# Patient Record
Sex: MALE | Race: BLACK OR AFRICAN AMERICAN | NOT HISPANIC OR LATINO | Employment: UNEMPLOYED | URBAN - METROPOLITAN AREA
[De-identification: names, ages, dates, MRNs, and addresses within clinical notes are randomized per-mention and may not be internally consistent; named-entity substitution may affect disease eponyms.]

---

## 2017-01-18 ENCOUNTER — OFFICE VISIT (OUTPATIENT)
Dept: PULMONOLOGY | Facility: CLINIC | Age: 47
End: 2017-01-18
Payer: OTHER MISCELLANEOUS

## 2017-01-18 ENCOUNTER — PROCEDURE VISIT (OUTPATIENT)
Dept: PULMONOLOGY | Facility: CLINIC | Age: 47
End: 2017-01-18
Payer: OTHER MISCELLANEOUS

## 2017-01-18 ENCOUNTER — HOSPITAL ENCOUNTER (OUTPATIENT)
Dept: RADIOLOGY | Facility: HOSPITAL | Age: 47
Discharge: HOME | End: 2017-01-18
Attending: INTERNAL MEDICINE
Payer: OTHER MISCELLANEOUS

## 2017-01-18 VITALS
SYSTOLIC BLOOD PRESSURE: 128 MMHG | WEIGHT: 315 LBS | HEIGHT: 67 IN | OXYGEN SATURATION: 97 % | BODY MASS INDEX: 49.44 KG/M2 | DIASTOLIC BLOOD PRESSURE: 86 MMHG | HEART RATE: 80 BPM

## 2017-01-18 DIAGNOSIS — F32.9 REACTIVE DEPRESSION (SITUATIONAL): ICD-10-CM

## 2017-01-18 DIAGNOSIS — F51.05 INSOMNIA DUE TO ANXIETY AND FEAR: ICD-10-CM

## 2017-01-18 DIAGNOSIS — R06.02 SOB (SHORTNESS OF BREATH): ICD-10-CM

## 2017-01-18 DIAGNOSIS — F40.9 INSOMNIA DUE TO ANXIETY AND FEAR: ICD-10-CM

## 2017-01-18 DIAGNOSIS — T59.91XA INHALATION OF NOXIOUS FUMES, ACCIDENTAL OR UNINTENTIONAL, INITIAL ENCOUNTER: Primary | ICD-10-CM

## 2017-01-18 PROCEDURE — 99999 PR PBB SHADOW E&M-EST. PATIENT-LVL III: CPT | Mod: PBBFAC,,, | Performed by: INTERNAL MEDICINE

## 2017-01-18 PROCEDURE — 99205 OFFICE O/P NEW HI 60 MIN: CPT | Mod: 25,S$GLB,, | Performed by: INTERNAL MEDICINE

## 2017-01-18 PROCEDURE — 71020 XR CHEST PA AND LATERAL: CPT | Mod: 26,,, | Performed by: RADIOLOGY

## 2017-01-18 RX ORDER — PROMETHAZINE HYDROCHLORIDE AND DEXTROMETHORPHAN HYDROBROMIDE 6.25; 15 MG/5ML; MG/5ML
5 SYRUP ORAL EVERY 6 HOURS PRN
Qty: 473 ML | Refills: 5 | Status: SHIPPED | OUTPATIENT
Start: 2017-01-18 | End: 2017-03-01 | Stop reason: SDUPTHER

## 2017-01-18 RX ORDER — BUDESONIDE 0.5 MG/2ML
0.5 INHALANT ORAL 2 TIMES DAILY
Qty: 120 ML | Refills: 12 | Status: SHIPPED | OUTPATIENT
Start: 2017-01-18 | End: 2017-03-01 | Stop reason: SDUPTHER

## 2017-01-18 RX ORDER — BENZONATATE 100 MG/1
100 CAPSULE ORAL 3 TIMES DAILY PRN
COMMUNITY
End: 2017-03-01 | Stop reason: SDUPTHER

## 2017-01-18 RX ORDER — TRAZODONE HYDROCHLORIDE 100 MG/1
100 TABLET ORAL NIGHTLY
Qty: 30 TABLET | Refills: 11 | Status: SHIPPED | OUTPATIENT
Start: 2017-01-18 | End: 2017-02-09 | Stop reason: SDUPTHER

## 2017-01-18 RX ORDER — ACETAMINOPHEN 500 MG
500 TABLET ORAL EVERY 6 HOURS PRN
COMMUNITY
End: 2018-03-13

## 2017-01-18 RX ORDER — MONTELUKAST SODIUM 10 MG/1
10 TABLET ORAL NIGHTLY
COMMUNITY
End: 2017-03-01 | Stop reason: SDUPTHER

## 2017-01-18 RX ORDER — IPRATROPIUM BROMIDE AND ALBUTEROL SULFATE 2.5; .5 MG/3ML; MG/3ML
3 SOLUTION RESPIRATORY (INHALATION) EVERY 6 HOURS
Qty: 120 VIAL | Refills: 12 | Status: SHIPPED | OUTPATIENT
Start: 2017-01-18 | End: 2017-03-01 | Stop reason: SDUPTHER

## 2017-01-18 RX ORDER — IPRATROPIUM BROMIDE 0.5 MG/2.5ML
500 SOLUTION RESPIRATORY (INHALATION) 4 TIMES DAILY
COMMUNITY
End: 2017-01-18 | Stop reason: ALTCHOICE

## 2017-01-18 NOTE — PROGRESS NOTES
January 18, 2017    INITIAL PULMONARY OFFICE NOTE SECOND OPINION    CLAIM NUMBER:  [id number].    [facility] case number [id number]    EMPLOYER:  [employer].    DATE OF INCIDENT:  04/14/2016.    Dear Ms. Finley:    I had the pleasure of meeting and evaluating Mr. Lamin Manjarrez today at the   [facility].  The purpose of this visit is a second   opinion concerning toxic fume inhalation.  Information available for my reviews   includes a four-page fax from [facility], which includes a patient questionnaire and   pulmonary function studies performed in 2016 as well as a radiographic disc with   x-rays.  Unfortunately, the disk was damaged in mailing and could not be   visualized.  In the note of 12/13/2016, you indicated that you forwarded me the   medicals from Dr. Rowan' office previously.  This information is not   immediately available for my review at the time of this dictation.    Mr. 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    Apparently, a co-worker allowed the release of gas in the direction where he was   walking.  He was immediately affected with irritation from these fumes.  The   patient states that fumes contained hydrochloric acid that he was in close   proximity.  He reports immediate irritation to his throat and difficulty   breathing.  He was able to run from the area.  He cleared the area after a   fairly short period of exposure.  He became nauseated and had some gagging.  He   reports that he had some irritation to the mucous membranes of his mouth.  No   problems with his eyes, his ears or other soft tissues on his face.  Following   this period of exertion from running, he passed out and was brought to the   hospital.  He reports that he awoke at [facility] in [place], [place]   and was treated and the left without requiring prolonged  hospitalization.  He   was seen the following day at the [facility] for difficulty breathing.    He was treated with inhaled bronchodilators, steroids and later was evaluated   by Dr. Rowan and has taken bronchodilators, albuterol and Symbicort.    Since this accident, the patient reports that he has persistent coughing and   shortness of breath.  He is unable perform work.  He does not tolerate dust,   fumes, perfumes or changes in temperature, especially with cold air.  The   coughing is sometimes severe enough that he has difficulty sleeping at night.    As a result of this illness, the patient reports that he has become very anxious   and jittery, he has difficulty sleeping at night and difficulty coping.  He   reports that his wife has left him and he remains extremely anxious.  He reports   no prior history of mental illness.  The patient is frustrated over the fact   that his symptoms have persisted and his present treatment regimen has not   completely resolved his symptoms.    The patient completed a personal history questionnaire concerning his past   medical history, family history and social history.  It is summarized as   follows.    The patient denies any significant past history of pulmonary or respiratory   problems prior to this incident.  He denies any significant past medical   problems.  He has had no prior history of high blood pressure, diabetes, heart   disease, musculoskeletal problems.  He reports he has had no surgeries.    SOCIAL HISTORY:  His spouse's health is good.  He has a number of children.  He   reports that they are in good health.    FAMILY HISTORY:  His father is alive at 71.  His mother is 68.  He has a brother   at 55, he does not list any medical problems with family members.    SOCIAL HISTORY:  Denies a significant history of tobacco use or alcohol use.    REVIEW OF SYSTEMS:  The patient reports occasional night sweats, fatigue, lack   of energy, headaches,  dizziness.  He reports occasional difficulty with ringing   in his ears.  He has postnasal drip and sinus congestion.  CARDIAC:  Occasional palpitations and chest discomfort.  The patient reports   musculoskeletal discomfort in his right shoulder.  He reports discomfort in his   legs, difficulty breathing as noticed in the history of present illness.  He has   difficulty at nighttime and sometimes he has to sleep propped up in bed.  GASTROINTESTINAL:  He has had changes in his appetite, heartburn, dyspepsia.  He   reports about a 15-pound weight gain.  Describes a frequency of urination,   problems with sexual function.  MUSCULOSKELETAL:  Problems with heaviness in his arms and legs, swelling in his   lower extremities, generalized muscle weakness.  NEURO/PSYCH:  The patient states he has become very high-strung intense.  He has   had significant difficulty with home relationships, difficulty making up his   mind, he is depressed and anxious about his future.    OCCUPATIONAL HISTORY:  Dust screening questionnaire, the patient denies any   significant history of exposure to asbestos or silica dust.  He has a history of   being exposed to welding fumes.  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  He was exposed to high   concentrations of welding fumes.    He reports no shipyard work, no carpentry work.    At times, he worked during shut downs, other times repairs and petrochemical   facilities.  He has no direct knowledge of exposure to asbestos.    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  He   usually was provided with a fresh air cortez for respiratory protection.  He was   exposed to large amount of sand from sandblasting.  He himself actually   performed  sandblasting.  He also was involved in helping regulate pressure on   sandblasting equipment.  He would fix hoses and fill the sand posts with   sandblasters.  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    PHYSICAL EXAMINATION:  VITAL SIGNS:  Height is 5 feet 7 inches.  His weight is 161 kg, blood pressure   128/86, pulse is 80 and regular, respiratory rate 14.   Oxygen saturation is 97%.  HEENT:  Pupils equal and reactive to light, slightly boggy nasal mucosa.  Throat   is normal.  NECK:  No lymphadenopathy.  CHEST:  Clear to auscultation.  No wheezing, rales or rhonchi.  HEART:  Distant heart sounds.  Regular rhythm.  ABDOMEN:  Soft.  Liver and spleen not enlarged.  EXTREMITIES:  No cyanosis or clubbing.  There is trace edema present.  NEUROLOGIC EXAMINATION:  Cranial nerves II-XII grossly within normal limits.    Motor and sensory grossly normal.    The patient appeared to be somewhat agitated and anxious during the course of   the interview, very frustrated over the lack of response to his present   treatment.  Frustrated over his difficulty sleeping at night due to his   respiratory condition.    DATA REVIEW:  Chest x-ray from the [facility], poor inspiratory   effort, heart size appears to be normal.    The patient is unable to perform pulmonary function studies in the office today.    Prior pulmonary function studies from [facility] occupational health   services in [place], [place] ordered by Dr. Rowan, the patient had some   difficulty performing spirometry maneuver due to coughing.  His best FEV1 was   2.62 liters, 80% of predicted performed on 06/17/2016.  Additional pulmonary   function study performed on 05/17/2016 also demonstrated difficulty in   performing the maneuver, on that date it was 1.37 liters, 44% of predicted.    ASSESSMENT:  Toxic fume inhalation.    RECOMMENDATIONS: The present treatment has not completely alleviated his symptoms.  Will  start on a more aggressive regimen of inhaled bronchodilators.   We will start the patient on a combination of albuterol and   ipratropium jet neb treatments followed by budesonide.  The patient's present   prescriptions include metered-dose inhalers which are appropriate; however, I do   not believe the patient is able to get a deep inhalation of these medications   due to his coughing and advised him to take these inhaled medications on a   regular basis and follow up in my office in 6 weeks with a repeat spirometry.  Follow up would be helpful in determining response to therapy.    Overall, this patient has a history of toxic fume inhalation complicated by   hyperactive airways. He has an overlay of anxiety which is making his response   to therapy difficult to objectively evaluate.    He is unable to work at this time at his prior profession.  He is unable to be   exposed to dust, fumes, gasses or extremes in temperature.  Based of the   information I have available here, he would not pass a respiratory fit exam.    Consideration should be made for him to move on to some other field.  We can   continue to work without being exposed to dust, fumes or gases.    Sincerely,      Matias Lugo MD  Pulmonary / Critical Care Medicine        GMBREANA/MELISSA  dd: 01/18/2017 13:46:35 (CST)  td: 01/18/2017 23:22:37 (CST)  Doc ID   #[id number]  Job ID #[id number]    CC:

## 2017-01-18 NOTE — MR AVS SNAPSHOT
[facility] - Pul[facility] Services  [address] [facility] [address] [place] [address]  Phone: [phone]  Fax: [phone]                  Lamin Manjarrez   2017 1:00 PM   Office Visit    Description:  Male : 1970   Provider:  Matias Lugo MD   Department:  [facility] - [facility]           Diagnoses this Visit        Comments    Inhalation of noxious fumes, accidental or unintentional, initial encounter    -  Primary     Reactive depression (situational)         Insomnia due to anxiety and fear                To Do List           Goals (5 Years of Data)     None      Follow-Up and Disposition     Return in about 6 weeks (around 3/1/2017) for pft.       These Medications        Disp Refills Start End    albuterol-ipratropium 2.5mg-0.5mg/3mL (DUO-NEB) 0.5 mg-3 mg(2.5 mg base)/3 mL nebulizer solution 120 vial 12 2017    Take 3 mLs by nebulization every 6 (six) hours. Worker's comp - Nebulization    Pharmacy: [id number] - [place] [address]  Ph #: [phone]       budesonide (PULMICORT) 0.5 mg/2 mL nebulizer solution 120 mL 12 2017    Take 2 mLs (0.5 mg total) by nebulization 2 (two) times daily. Wash out mouth after using.Worker's comp - Nebulization    Pharmacy: [id number] - [place] [address]  Ph #: [phone]       Notes to Pharmacy: Dispense # 60 vials of 0.5mg/2ml    promethazine-dextromethorphan (PROMETHAZINE-DM) 6.25-15 mg/5 mL Syrp 473 mL 5 2017     Take 5 mLs by mouth every 6 (six) hours as needed. Worker's comp - Oral    Pharmacy: [id number] - [place] [address]  Ph #: [phone]       trazodone (DESYREL) 100 MG tablet 30 tablet 11 2017    Take 1 tablet (100 mg total) by mouth every evening. Worker's comp - Oral    Pharmacy: [id number] - [place] - [address]  Ph #: [phone]         [facility]     [facility] -  Assistance  Registered nurses  in the [facility] provide clinical advisement, health education, appointment booking, and other advisory services.  Call for this free service at [phone].             Medications           Message regarding Medications     Verify the changes and/or additions to your medication regime listed below are the same as discussed with your clinician today.  If any of these changes or additions are incorrect, please notify your healthcare provider.        START taking these NEW medications        Refills    albuterol-ipratropium 2.5mg-0.5mg/3mL (DUO-NEB) 0.5 mg-3 mg(2.5 mg base)/3 mL nebulizer solution 12    Sig: Take 3 mLs by nebulization every 6 (six) hours. Worker's comp    Class: Normal    Route: Nebulization    budesonide (PULMICORT) 0.5 mg/2 mL nebulizer solution 12    Sig: Take 2 mLs (0.5 mg total) by nebulization 2 (two) times daily. Wash out mouth after using.Worker's comp    Class: Normal    Route: Nebulization    promethazine-dextromethorphan (PROMETHAZINE-DM) 6.25-15 mg/5 mL Syrp 5    Sig: Take 5 mLs by mouth every 6 (six) hours as needed. Worker's comp    Class: Normal    Route: Oral    trazodone (DESYREL) 100 MG tablet 11    Sig: Take 1 tablet (100 mg total) by mouth every evening. Worker's comp    Class: Normal    Route: Oral      STOP taking these medications     ipratropium (ATROVENT) 0.02 % nebulizer solution Take 500 mcg by nebulization 4 (four) times daily.           Verify that the below list of medications is an accurate representation of the medications you are currently taking.  If none reported, the list may be blank. If incorrect, please contact your healthcare provider. Carry this list with you in case of emergency.           Current Medications     acetaminophen (TYLENOL) 500 MG tablet Take 500 mg by mouth every 6 (six) hours as needed for Pain.    ALBUTEROL SULFATE (VENTOLIN HFA INHL) Inhale into the lungs.    albuterol-ipratropium 2.5mg-0.5mg/3mL (DUO-NEB) 0.5 mg-3 mg(2.5 mg  "base)/3 mL nebulizer solution Take 3 mLs by nebulization every 6 (six) hours. Worker's comp    benzonatate (TESSALON) 100 MG capsule Take 100 mg by mouth 3 (three) times daily as needed for Cough.    budesonide (PULMICORT) 0.5 mg/2 mL nebulizer solution Take 2 mLs (0.5 mg total) by nebulization 2 (two) times daily. Wash out mouth after using.Worker's comp    montelukast (SINGULAIR) 10 mg tablet Take 10 mg by mouth every evening.    promethazine-dextromethorphan (PROMETHAZINE-DM) 6.25-15 mg/5 mL Syrp Take 5 mLs by mouth every 6 (six) hours as needed. Worker's comp    trazodone (DESYREL) 100 MG tablet Take 1 tablet (100 mg total) by mouth every evening. Worker's comp           Clinical Reference Information           Vital Signs - Last Recorded  Most recent update: 1/18/2017 11:42 AM by Laila Tovar LPN    BP Pulse Ht Wt SpO2 BMI    128/86 80 5' 7" (1.702 m) (!) 161 kg (354 lb 15.1 oz) 97% 55.59 kg/m2      Blood Pressure          Most Recent Value    BP  128/86      Allergies as of 1/18/2017     Toradol [Ketorolac]      Immunizations Administered on Date of Encounter - 1/18/2017     None      Orders Placed During Today's Visit      Normal Orders This Visit    NEBULIZER FOR HOME USE     NEBULIZER KIT (SUPPLIES) FOR HOME USE     Future Labs/Procedures Expected by Expires    Complete PFT with bronchodilator  As directed 1/18/2018      [facility] Sign-Up     Activating your [facility] account is as easy as 1-2-3!     1) Visit [email or web address], select Sign Up Now, enter this activation code and your date of birth, then select Next.  [email or web address]  Expires: 3/4/2017  1:00 PM      2) Create a username and password to use when you visit [facility] in the future and select a security question in case you lose your password and select Next.    3) Enter your e-mail address and click Sign Up!    Additional Information  If you have questions, please e-mail [email or web address] or call [phone] to talk to our [facility] " staff. Remember, [facility] is NOT to be used for urgent needs. For medical emergencies, dial 911.         Instructions    Budesonide Nebulizer suspension  What is this medicine?  BUDESONIDE (bue PAPA oh nide) is a corticosteroid. It helps decrease inflammation in your lungs. This medicine is used to treat the symptoms of asthma. Never use this medicine for an acute asthma attack.  This medicine may be used for other purposes; ask your health care provider or pharmacist if you have questions.  What should I tell my health care provider before I take this medicine?  They need to know if you have any of these conditions:  · bone problems  · glaucoma  · immune system problems  · infection, like chickenpox, tuberculosis, herpes, or fungal infection  · recent surgery or injury of the mouth or throat  · taking corticosteroids by mouth  · an unusual or allergic reaction to budesonide, steroids, other medicines, foods, dyes, or preservatives  · pregnant or trying to get pregnant  · breast-feeding  How should I use this medicine?  This medicine is used in a nebulizer. Nebulizers make a liquid into an aerosol that you breathe in through your mouth or your mouth and nose into your lungs. You will be taught how to use your nebulizer. Rinse your mouth with water after use. Follow the directions on your prescription label. Do not mix this medicine with other medicines in your nebulizer. Do not use more often than directed.  Talk to your pediatrician regarding the use of this medicine in children. Special care may be needed.  Overdosage: If you think you have taken too much of this medicine contact a poison control center or emergency room at once.  NOTE: This medicine is only for you. Do not share this medicine with others.  What if I miss a dose?  If you miss a dose, use it as soon as you remember. If it is almost time for your next dose, use only that dose and continue with your regular schedule, spacing doses evenly. Do not use  double or extra doses.  What may interact with this medicine?  Do not take this medicine with any of the following medications:  · mifepristone  This medicine may also interact with the following medications:  · cimetidine  · clarithromycin  · erythromycin  · itraconazole  · ketoconazole  · some vaccinations  This list may not describe all possible interactions. Give your health care provider a list of all the medicines, herbs, non-prescription drugs, or dietary supplements you use. Also tell them if you smoke, drink alcohol, or use illegal drugs. Some items may interact with your medicine.  What should I watch for while using this medicine?  Visit your doctor or health care professional for regular checks on your progress. Check with your health care professional if your symptoms do not improve. If your symptoms get worse or if you need your short acting inhalers more often, call your doctor right away.  The medicine may increase your risk of getting an infection. Stay away from people who are sick. Tell your doctor or health care professional if you are around anyone with measles or chickenpox.  What side effects may I notice from receiving this medicine?  Side effects that you should report to your doctor or health care professional as soon as possible:  · allergic reactions like skin rash, itching or hives, swelling of the face, lips, or tongue  · breathing problems  · changes in vision  · unusual swelling  · white patches or sores in the mouth or throat  Side effects that usually do not require medical attention (report to your doctor or health care professional if they continue or are bothersome):  · coughing, hoarseness  · headache  · runny nose  · stomach upset  This list may not describe all possible side effects. Call your doctor for medical advice about side effects. You may report side effects to FDA at 2-415-FDA-3856.  Where should I keep my medicine?  Keep out of the reach of children.  Store at a room  temperature between 20 and 25 degrees C (68 and 77 degrees F). Do not refrigerate or freeze. Keep unopened vials in the foil pouch. When the package has been opened, the shelf life of the unused medicine is 2 weeks when protected from light. Unused medicine should be returned to the aluminum foil envelope right away to protect them from light. Throw away any unused medicine after the expiration date.  NOTE:This sheet is a summary. It may not cover all possible information. If you have questions about this medicine, talk to your doctor, pharmacist, or health care provider. Copyright© 2016 [employer]      Ipratropium Bromide, Albuterol Sulfate Nebulizer solution  What is this medicine?  ALBUTEROL; IPRATROPIUM (al BYOO ter ole; i pra TROE pee um) has two bronchodilators. It helps open up the airways in your lungs to make it easier to breathe. This medicine is used to treat chronic obstructive pulmonary disease (COPD).  This medicine may be used for other purposes; ask your health care provider or pharmacist if you have questions.  What should I tell my health care provider before I take this medicine?  They need to know if you have any of the following conditions:  · heart disease  · high blood pressure  · irregular heartbeat  · an unusual or allergic reaction to albuterol, ipratropium, atropine, soya protein, soybeans or peanuts, other medicines, foods, dyes, or preservatives  · pregnant or trying to get pregnant  · breast-feeding  How should I use this medicine?  This medicine is used in a nebulizer. Nebulizers make a liquid into an aerosol that you breathe in through your mouth or your mouth and nose into your lungs. You will be taught how to use your nebulizer. Follow the directions on your prescription label. Take your medicine at regular intervals. Do not use more often than directed.  Talk to your pediatrician regarding the use of this medicine in children. Special care may be needed.  Overdosage: If you  think you have taken too much of this medicine contact a poison control center or emergency room at once.  NOTE: This medicine is only for you. Do not share this medicine with others.  What if I miss a dose?  If you miss a dose, use it as soon as you can. If it is almost time for your next dose, use only that dose. Do not use double or extra doses.  What may interact with this medicine?  Do not take this medicine with any of the following medications:  · MAOIs like Carbex, Eldepryl, Marplan, Nardil, and Parnate  This medicine may also interact with the following medications:  · diuretics  · medicines for depression, anxiety, or psychotic disturbances  · medicines for irregular heartbeat  · medicines for weight loss including some herbal products  · methadone  · pimozide  · some medicines for blood pressure or the heart  · sertindole  This list may not describe all possible interactions. Give your health care provider a list of all the medicines, herbs, non-prescription drugs, or dietary supplements you use. Also tell them if you smoke, drink alcohol, or use illegal drugs. Some items may interact with your medicine.  What should I watch for while using this medicine?  Tell your doctor or healthcare professional if your symptoms do not start to get better or if they get worse. If your breathing gets worse while you are using this medicine, call your doctor right away. Do not stop using your medicine unless your doctor tells you to.  Your mouth may get dry. Chewing sugarless gum or sucking hard candy, and drinking plenty of water may help. Contact your doctor if the problem does not go away or is severe.  You may get dizzy or have blurred vision. Do not drive, use machinery, or do anything that needs mental alertness until you know how this medicine affects you. Do not stand or sit up quickly, especially if you are an older patient. This reduces the risk of dizzy or fainting spells.  What side effects may I notice from  receiving this medicine?  Side effects that you should report to your doctor or health care professional as soon as possible:  · allergic reactions like skin rash, itching or hives, swelling of the face, lips, or tongue  · breathing problems  · feeling faint or lightheaded, falls  · fever  · high blood pressure  · irregular heartbeat or chest pain  · muscle cramps or weakness  · pain, tingling, numbness in the hands or feet  · vomiting  Side effects that usually do not require medical attention (report to your doctor or health care professional if they continue or are bothersome):  · blurred vision  · cough  · difficulty passing urine  · difficulty sleeping  · headache  · nervousness or trembling  · stuffy or runny nose  · unusual taste  · upset stomach  This list may not describe all possible side effects. Call your doctor for medical advice about side effects. You may report side effects to FDA at 4-314-FDA-7514.  Where should I keep my medicine?  Keep out of the reach of children.  Store at a room temperature 2 and 30 degees C (36 to 86 degrees F). Protect from light. Store this medicine in the protective pouch until ready to use. Throw away any unused medicine after the expiration date.  NOTE:This sheet is a summary. It may not cover all possible information. If you have questions about this medicine, talk to your doctor, pharmacist, or health care provider. Copyright© 2016 [employer]        Step-by-Step  Using a Nebulizer with a Mouthpiece    © 1903-3192 [employer]. [address] [place] [address]. All rights reserved. This information is not intended as a substitute for professional medical care. Always follow your healthcare professional's instructions.        Step-by-Step  Using a Nebulizer    © 0828-5536 [employer]. [address] [place] [address]. All rights reserved. This information is not intended as a substitute for professional medical care. Always  follow your healthcare professional's instructions.      Trazodone Hydrochloride Oral tablet  What is this medicine?  TRAZODONE (TRAZ oh done) is used to treat depression.  This medicine may be used for other purposes; ask your health care provider or pharmacist if you have questions.  What should I tell my health care provider before I take this medicine?  They need to know if you have any of these conditions:  · attempted suicide or thinking about it  · bipolar disorder  · bleeding problems  · glaucoma  · heart disease, or previous heart attack  · irregular heart beat  · kidney or liver disease  · low levels of sodium in the blood  · an unusual or allergic reaction to trazodone, other medicines, foods, dyes or preservatives  · pregnant or trying to get pregnant  · breast-feeding  How should I use this medicine?  Take this medicine by mouth with a glass of water. Follow the directions on the prescription label. Take this medicine shortly after a meal or a light snack. Take your medicine at regular intervals. Do not take your medicine more often than directed. Do not stop taking this medicine suddenly except upon the advice of your doctor. Stopping this medicine too quickly may cause serious side effects or your condition may worsen.  A special MedGuide will be given to you by the pharmacist with each prescription and refill. Be sure to read this information carefully each time.  Talk to your pediatrician regarding the use of this medicine in children. Special care may be needed.  Overdosage: If you think you have taken too much of this medicine contact a poison control center or emergency room at once.  NOTE: This medicine is only for you. Do not share this medicine with others.  What if I miss a dose?  If you miss a dose, take it as soon as you can. If it is almost time for your next dose, take only that dose. Do not take double or extra doses.  What may interact with this medicine?  Do not take this medicine with  any of the following medications:  · cisapride  · dofetilide  · dronedarone  · fluconazole  · linezolid  · MAOIs like Carbex, Eldepryl, Marplan, Nardil, and Parnate  · mesoridazine  · methylene blue (injected into a vein)  · pimozide  · posaconazole  · saquinavir  · thioridazine  · ziprasidone  This medicine may also interact with the following medications:  · alcohol  · antiviral medicines for HIV or AIDS  · aspirin and aspirin-like medicines  · barbiturates such as phenobarbital  · certain medicines that treat or prevent blood clots like warfarin, enoxaparin, and dalteparin  · certain medicines for blood pressure, heart disease, irregular heart beat  · certain medicines for depression, anxiety, or psychotic disturbances  · certain medicines for migraine headache like almotriptan, eletriptan, frovatriptan, naratriptan, rizatriptan, sumatriptan, zolmitriptan  · certain medicines for sleep  · digoxin  · fentanyl  · ketoconazole  · lithium  · medicines for seizures like carbamazepine and phenytoin  · NSAIDS, medicines for pain and inflammation, like ibuprofen or naproxen  · rasagiline  · supplements like Roel's wort, kava kava, valerian  · tramadol  · tryptophan  This list may not describe all possible interactions. Give your health care provider a list of all the medicines, herbs, non-prescription drugs, or dietary supplements you use. Also tell them if you smoke, drink alcohol, or use illegal drugs. Some items may interact with your medicine.  What should I watch for while using this medicine?  Tell your doctor if your symptoms do not get better or if they get worse. Visit your doctor or health care professional for regular checks on your progress. Because it may take several weeks to see the full effects of this medicine, it is important to continue your treatment as prescribed by your doctor.  Patients and their families should watch out for new or worsening thoughts of suicide or depression. Also watch out  for sudden changes in feelings such as feeling anxious, agitated, panicky, irritable, hostile, aggressive, impulsive, severely restless, overly excited and hyperactive, or not being able to sleep. If this happens, especially at the beginning of treatment or after a change in dose, call your health care professional.  You may get drowsy or dizzy. Do not drive, use machinery, or do anything that needs mental alertness until you know how this medicine affects you. Do not stand or sit up quickly, especially if you are an older patient. This reduces the risk of dizzy or fainting spells. Alcohol may interfere with the effect of this medicine. Avoid alcoholic drinks.  This medicine may cause dry eyes and blurred vision. If you wear contact lenses you may feel some discomfort. Lubricating drops may help. See your eye doctor if the problem does not go away or is severe.  Your mouth may get dry. Chewing sugarless gum, sucking hard candy and drinking plenty of water may help. Contact your doctor if the problem does not go away or is severe.  What side effects may I notice from receiving this medicine?  Side effects that you should report to your doctor or health care professional as soon as possible:  · allergic reactions like skin rash, itching or hives, swelling of the face, lips, or tongue  · fast, irregular heartbeat  · feeling faint or lightheaded, falls  · painful erections or other sexual dysfunction  · suicidal thoughts or other mood changes  · trembling  Side effects that usually do not require medical attention (report to your doctor or health care professional if they continue or are bothersome):  · constipation  · headache  · muscle aches or pains  · nausea, vomiting  · unusually weak or tired  This list may not describe all possible side effects. Call your doctor for medical advice about side effects. You may report side effects to FDA at 3-913-FDA-9184.  Where should I keep my medicine?  Keep out of the reach of  children.  Store at room temperature between 15 and 30 degrees C (59 to 86 degrees F). Protect from light. Keep container tightly closed. Throw away any unused medicine after the expiration date.  NOTE:This sheet is a summary. It may not cover all possible information. If you have questions about this medicine, talk to your doctor, pharmacist, or health care provider. Copyright© 2016 [employer]      Use Budesonide jet nebs twice a day - no more or no less.  Use Albuterol prior to budesonide at least twice a day. May use albuterol every 4 - 6 hours if needed for shortness of breath, cough or chest congestion

## 2017-01-18 NOTE — LETTER
January 18, 2017    [facility] [place]       [facility] - Pulmonary Services  [address] [facility] [address]  [place] [address]  Phone: [phone]  Fax: [phone] January 18, 2017     Patient: Lamin Manjarrez   YOB: 1970   Date of Visit: 1/18/2017       To Whom It May Concern:    It is my medical opinion that Lamin Manjarrez may return to limited participation immediately with the following restrictions no exposure to dust, fumes, gasses or extremes of temperature..    If you have any questions or concerns, please don't hesitate to call.    Sincerely,        Matias Lugo MD

## 2017-01-18 NOTE — PATIENT INSTRUCTIONS
Budesonide Nebulizer suspension  What is this medicine?  BUDESONIDE (bue PAPA oh nide) is a corticosteroid. It helps decrease inflammation in your lungs. This medicine is used to treat the symptoms of asthma. Never use this medicine for an acute asthma attack.  This medicine may be used for other purposes; ask your health care provider or pharmacist if you have questions.  What should I tell my health care provider before I take this medicine?  They need to know if you have any of these conditions:  · bone problems  · glaucoma  · immune system problems  · infection, like chickenpox, tuberculosis, herpes, or fungal infection  · recent surgery or injury of the mouth or throat  · taking corticosteroids by mouth  · an unusual or allergic reaction to budesonide, steroids, other medicines, foods, dyes, or preservatives  · pregnant or trying to get pregnant  · breast-feeding  How should I use this medicine?  This medicine is used in a nebulizer. Nebulizers make a liquid into an aerosol that you breathe in through your mouth or your mouth and nose into your lungs. You will be taught how to use your nebulizer. Rinse your mouth with water after use. Follow the directions on your prescription label. Do not mix this medicine with other medicines in your nebulizer. Do not use more often than directed.  Talk to your pediatrician regarding the use of this medicine in children. Special care may be needed.  Overdosage: If you think you have taken too much of this medicine contact a poison control center or emergency room at once.  NOTE: This medicine is only for you. Do not share this medicine with others.  What if I miss a dose?  If you miss a dose, use it as soon as you remember. If it is almost time for your next dose, use only that dose and continue with your regular schedule, spacing doses evenly. Do not use double or extra doses.  What may interact with this medicine?  Do not take this medicine with any of the following  medications:  · mifepristone  This medicine may also interact with the following medications:  · cimetidine  · clarithromycin  · erythromycin  · itraconazole  · ketoconazole  · some vaccinations  This list may not describe all possible interactions. Give your health care provider a list of all the medicines, herbs, non-prescription drugs, or dietary supplements you use. Also tell them if you smoke, drink alcohol, or use illegal drugs. Some items may interact with your medicine.  What should I watch for while using this medicine?  Visit your doctor or health care professional for regular checks on your progress. Check with your health care professional if your symptoms do not improve. If your symptoms get worse or if you need your short acting inhalers more often, call your doctor right away.  The medicine may increase your risk of getting an infection. Stay away from people who are sick. Tell your doctor or health care professional if you are around anyone with measles or chickenpox.  What side effects may I notice from receiving this medicine?  Side effects that you should report to your doctor or health care professional as soon as possible:  · allergic reactions like skin rash, itching or hives, swelling of the face, lips, or tongue  · breathing problems  · changes in vision  · unusual swelling  · white patches or sores in the mouth or throat  Side effects that usually do not require medical attention (report to your doctor or health care professional if they continue or are bothersome):  · coughing, hoarseness  · headache  · runny nose  · stomach upset  This list may not describe all possible side effects. Call your doctor for medical advice about side effects. You may report side effects to FDA at 3-958-FDA-0762.  Where should I keep my medicine?  Keep out of the reach of children.  Store at a room temperature between 20 and 25 degrees C (68 and 77 degrees F). Do not refrigerate or freeze. Keep unopened vials  in the foil pouch. When the package has been opened, the shelf life of the unused medicine is 2 weeks when protected from light. Unused medicine should be returned to the aluminum foil envelope right away to protect them from light. Throw away any unused medicine after the expiration date.  NOTE:This sheet is a summary. It may not cover all possible information. If you have questions about this medicine, talk to your doctor, pharmacist, or health care provider. Copyright© 2016 [employer]      Ipratropium Bromide, Albuterol Sulfate Nebulizer solution  What is this medicine?  ALBUTEROL; IPRATROPIUM (al BYOO ter ole; i pra TROE pee um) has two bronchodilators. It helps open up the airways in your lungs to make it easier to breathe. This medicine is used to treat chronic obstructive pulmonary disease (COPD).  This medicine may be used for other purposes; ask your health care provider or pharmacist if you have questions.  What should I tell my health care provider before I take this medicine?  They need to know if you have any of the following conditions:  · heart disease  · high blood pressure  · irregular heartbeat  · an unusual or allergic reaction to albuterol, ipratropium, atropine, soya protein, soybeans or peanuts, other medicines, foods, dyes, or preservatives  · pregnant or trying to get pregnant  · breast-feeding  How should I use this medicine?  This medicine is used in a nebulizer. Nebulizers make a liquid into an aerosol that you breathe in through your mouth or your mouth and nose into your lungs. You will be taught how to use your nebulizer. Follow the directions on your prescription label. Take your medicine at regular intervals. Do not use more often than directed.  Talk to your pediatrician regarding the use of this medicine in children. Special care may be needed.  Overdosage: If you think you have taken too much of this medicine contact a poison control center or emergency room at once.  NOTE: This  medicine is only for you. Do not share this medicine with others.  What if I miss a dose?  If you miss a dose, use it as soon as you can. If it is almost time for your next dose, use only that dose. Do not use double or extra doses.  What may interact with this medicine?  Do not take this medicine with any of the following medications:  · MAOIs like Carbex, Eldepryl, Marplan, Nardil, and Parnate  This medicine may also interact with the following medications:  · diuretics  · medicines for depression, anxiety, or psychotic disturbances  · medicines for irregular heartbeat  · medicines for weight loss including some herbal products  · methadone  · pimozide  · some medicines for blood pressure or the heart  · sertindole  This list may not describe all possible interactions. Give your health care provider a list of all the medicines, herbs, non-prescription drugs, or dietary supplements you use. Also tell them if you smoke, drink alcohol, or use illegal drugs. Some items may interact with your medicine.  What should I watch for while using this medicine?  Tell your doctor or healthcare professional if your symptoms do not start to get better or if they get worse. If your breathing gets worse while you are using this medicine, call your doctor right away. Do not stop using your medicine unless your doctor tells you to.  Your mouth may get dry. Chewing sugarless gum or sucking hard candy, and drinking plenty of water may help. Contact your doctor if the problem does not go away or is severe.  You may get dizzy or have blurred vision. Do not drive, use machinery, or do anything that needs mental alertness until you know how this medicine affects you. Do not stand or sit up quickly, especially if you are an older patient. This reduces the risk of dizzy or fainting spells.  What side effects may I notice from receiving this medicine?  Side effects that you should report to your doctor or health care professional as soon as  possible:  · allergic reactions like skin rash, itching or hives, swelling of the face, lips, or tongue  · breathing problems  · feeling faint or lightheaded, falls  · fever  · high blood pressure  · irregular heartbeat or chest pain  · muscle cramps or weakness  · pain, tingling, numbness in the hands or feet  · vomiting  Side effects that usually do not require medical attention (report to your doctor or health care professional if they continue or are bothersome):  · blurred vision  · cough  · difficulty passing urine  · difficulty sleeping  · headache  · nervousness or trembling  · stuffy or runny nose  · unusual taste  · upset stomach  This list may not describe all possible side effects. Call your doctor for medical advice about side effects. You may report side effects to FDA at 2-979-AIM-6715.  Where should I keep my medicine?  Keep out of the reach of children.  Store at a room temperature 2 and 30 degees C (36 to 86 degrees F). Protect from light. Store this medicine in the protective pouch until ready to use. Throw away any unused medicine after the expiration date.  NOTE:This sheet is a summary. It may not cover all possible information. If you have questions about this medicine, talk to your doctor, pharmacist, or health care provider. Copyright© 2016 [employer]        Step-by-Step  Using a Nebulizer with a Mouthpiece    © 5004-4834 [employer]. [address] [place] [address]. All rights reserved. This information is not intended as a substitute for professional medical care. Always follow your healthcare professional's instructions.        Step-by-Step  Using a Nebulizer    © 9540-0473 [employer]. [address] [place] [address]. All rights reserved. This information is not intended as a substitute for professional medical care. Always follow your healthcare professional's instructions.      Trazodone Hydrochloride Oral tablet  What is this  medicine?  TRAZODONE (TRAZ oh done) is used to treat depression.  This medicine may be used for other purposes; ask your health care provider or pharmacist if you have questions.  What should I tell my health care provider before I take this medicine?  They need to know if you have any of these conditions:  · attempted suicide or thinking about it  · bipolar disorder  · bleeding problems  · glaucoma  · heart disease, or previous heart attack  · irregular heart beat  · kidney or liver disease  · low levels of sodium in the blood  · an unusual or allergic reaction to trazodone, other medicines, foods, dyes or preservatives  · pregnant or trying to get pregnant  · breast-feeding  How should I use this medicine?  Take this medicine by mouth with a glass of water. Follow the directions on the prescription label. Take this medicine shortly after a meal or a light snack. Take your medicine at regular intervals. Do not take your medicine more often than directed. Do not stop taking this medicine suddenly except upon the advice of your doctor. Stopping this medicine too quickly may cause serious side effects or your condition may worsen.  A special MedGuide will be given to you by the pharmacist with each prescription and refill. Be sure to read this information carefully each time.  Talk to your pediatrician regarding the use of this medicine in children. Special care may be needed.  Overdosage: If you think you have taken too much of this medicine contact a poison control center or emergency room at once.  NOTE: This medicine is only for you. Do not share this medicine with others.  What if I miss a dose?  If you miss a dose, take it as soon as you can. If it is almost time for your next dose, take only that dose. Do not take double or extra doses.  What may interact with this medicine?  Do not take this medicine with any of the following  medications:  · cisapride  · dofetilide  · dronedarone  · fluconazole  · linezolid  · MAOIs like Carbex, Eldepryl, Marplan, Nardil, and Parnate  · mesoridazine  · methylene blue (injected into a vein)  · pimozide  · posaconazole  · saquinavir  · thioridazine  · ziprasidone  This medicine may also interact with the following medications:  · alcohol  · antiviral medicines for HIV or AIDS  · aspirin and aspirin-like medicines  · barbiturates such as phenobarbital  · certain medicines that treat or prevent blood clots like warfarin, enoxaparin, and dalteparin  · certain medicines for blood pressure, heart disease, irregular heart beat  · certain medicines for depression, anxiety, or psychotic disturbances  · certain medicines for migraine headache like almotriptan, eletriptan, frovatriptan, naratriptan, rizatriptan, sumatriptan, zolmitriptan  · certain medicines for sleep  · digoxin  · fentanyl  · ketoconazole  · lithium  · medicines for seizures like carbamazepine and phenytoin  · NSAIDS, medicines for pain and inflammation, like ibuprofen or naproxen  · rasagiline  · supplements like Roel's wort, kava kava, valerian  · tramadol  · tryptophan  This list may not describe all possible interactions. Give your health care provider a list of all the medicines, herbs, non-prescription drugs, or dietary supplements you use. Also tell them if you smoke, drink alcohol, or use illegal drugs. Some items may interact with your medicine.  What should I watch for while using this medicine?  Tell your doctor if your symptoms do not get better or if they get worse. Visit your doctor or health care professional for regular checks on your progress. Because it may take several weeks to see the full effects of this medicine, it is important to continue your treatment as prescribed by your doctor.  Patients and their families should watch out for new or worsening thoughts of suicide or depression. Also watch out for sudden changes in  feelings such as feeling anxious, agitated, panicky, irritable, hostile, aggressive, impulsive, severely restless, overly excited and hyperactive, or not being able to sleep. If this happens, especially at the beginning of treatment or after a change in dose, call your health care professional.  You may get drowsy or dizzy. Do not drive, use machinery, or do anything that needs mental alertness until you know how this medicine affects you. Do not stand or sit up quickly, especially if you are an older patient. This reduces the risk of dizzy or fainting spells. Alcohol may interfere with the effect of this medicine. Avoid alcoholic drinks.  This medicine may cause dry eyes and blurred vision. If you wear contact lenses you may feel some discomfort. Lubricating drops may help. See your eye doctor if the problem does not go away or is severe.  Your mouth may get dry. Chewing sugarless gum, sucking hard candy and drinking plenty of water may help. Contact your doctor if the problem does not go away or is severe.  What side effects may I notice from receiving this medicine?  Side effects that you should report to your doctor or health care professional as soon as possible:  · allergic reactions like skin rash, itching or hives, swelling of the face, lips, or tongue  · fast, irregular heartbeat  · feeling faint or lightheaded, falls  · painful erections or other sexual dysfunction  · suicidal thoughts or other mood changes  · trembling  Side effects that usually do not require medical attention (report to your doctor or health care professional if they continue or are bothersome):  · constipation  · headache  · muscle aches or pains  · nausea, vomiting  · unusually weak or tired  This list may not describe all possible side effects. Call your doctor for medical advice about side effects. You may report side effects to FDA at 5-415-CYH-2341.  Where should I keep my medicine?  Keep out of the reach of children.  Store at  room temperature between 15 and 30 degrees C (59 to 86 degrees F). Protect from light. Keep container tightly closed. Throw away any unused medicine after the expiration date.  NOTE:This sheet is a summary. It may not cover all possible information. If you have questions about this medicine, talk to your doctor, pharmacist, or health care provider. Copyright© 2016 [employer]      Use Budesonide jet nebs twice a day - no more or no less.  Use Albuterol prior to budesonide at least twice a day. May use albuterol every 4 - 6 hours if needed for shortness of breath, cough or chest congestion

## 2017-01-19 ENCOUNTER — TELEPHONE (OUTPATIENT)
Dept: PULMONOLOGY | Facility: CLINIC | Age: 47
End: 2017-01-19

## 2017-01-19 NOTE — TELEPHONE ENCOUNTER
----- Message from Jyoti Burroughs sent at 1/19/2017  8:51 AM CST -----  Contact: pt  Pt would like to speak to a nurse re the cough med he was prescribed is not helping to stop the cough at all preventing him from sleeping, and one he used to take that did help was Promethazine/Codine 10/6.25.  He wants to know if perhaps he could be prescribed that one?  Please call pt at the above number.

## 2017-01-19 NOTE — TELEPHONE ENCOUNTER
Attempted to call patient back regarding symptoms and cough syrup.  Recording states number is not reachable.

## 2017-01-20 ENCOUNTER — TELEPHONE (OUTPATIENT)
Dept: PULMONOLOGY | Facility: CLINIC | Age: 47
End: 2017-01-20

## 2017-01-20 NOTE — TELEPHONE ENCOUNTER
Attempted to call back patient on number provided with same message regarding party not available.  Called back again and patient answered.  Discussed request of promethazine with codeine denied by Dr. Lugo at this time.  Dr. Lugo advised and instructed patient to use the regimen prescribed and then to return at appointment.  Also advised to call back next week to report progress.  Patient hung up without responding.

## 2017-01-20 NOTE — TELEPHONE ENCOUNTER
----- Message from Donna Monroy sent at 1/20/2017  8:41 AM CST -----  States he was recently seen in the office and left a message on yesterday regarding his medication. States the medication he is on is not working. States no one returned his call. Please adv/call [phone].//thanks. cw

## 2017-01-24 ENCOUNTER — TELEPHONE (OUTPATIENT)
Dept: PULMONOLOGY | Facility: CLINIC | Age: 47
End: 2017-01-24

## 2017-01-24 NOTE — TELEPHONE ENCOUNTER
Spoke with Maryann Finley regarding patient's visit and need of letter and office visit.  She represents [employer].  Letter and office visit faxed to [phone]

## 2017-01-24 NOTE — TELEPHONE ENCOUNTER
----- Message from Elaine Shipley sent at 1/24/2017 11:01 AM CST -----  Contact: ms sylvester  needs to know if second medical opinion report is available..[phone]

## 2017-02-09 ENCOUNTER — TELEPHONE (OUTPATIENT)
Dept: PULMONOLOGY | Facility: CLINIC | Age: 47
End: 2017-02-09

## 2017-02-09 DIAGNOSIS — F51.05 INSOMNIA DUE TO ANXIETY AND FEAR: ICD-10-CM

## 2017-02-09 DIAGNOSIS — F40.9 INSOMNIA DUE TO ANXIETY AND FEAR: ICD-10-CM

## 2017-02-09 RX ORDER — TRAZODONE HYDROCHLORIDE 100 MG/1
150 TABLET ORAL NIGHTLY PRN
Qty: 30 TABLET | Refills: 11 | Status: SHIPPED | OUTPATIENT
Start: 2017-02-09 | End: 2017-03-01

## 2017-02-09 NOTE — TELEPHONE ENCOUNTER
----- Message from Donna Monroy sent at 2/8/2017  4:00 PM CST -----  States the medication that was prescribed for him to sleep is not working. States in the beginning it was working and states now it is not helping. Please adv/call [phone].//thanks. cw

## 2017-02-09 NOTE — TELEPHONE ENCOUNTER
----- Message from Donna Monroy sent at 2/9/2017  1:10 PM CST -----  Patient returning nurse call. Please adv/call [phone].//thanks. cw

## 2017-02-10 ENCOUNTER — TELEPHONE (OUTPATIENT)
Dept: PULMONOLOGY | Facility: CLINIC | Age: 47
End: 2017-02-10

## 2017-02-10 NOTE — TELEPHONE ENCOUNTER
Spoke with patient and explained how to take the medication Trazodone that is ordered and also the information instructed by Dr. Lugo.  Patient verbalizes understanding.

## 2017-02-10 NOTE — TELEPHONE ENCOUNTER
----- Message from Minerva Cruz sent at 2/10/2017 10:10 AM CST -----  Contact: pt  Pt is requesting to speak with nurse regarding concern about medication. Pt stated there was supposed to be a change in his medication dosage but when he  rx it was a duplicate to previous medication. Pls call pt back at [phone]

## 2017-02-10 NOTE — TELEPHONE ENCOUNTER
Attempted to reach patient to no avail.  Unable to leave message.  Dr. Lugo increased dose of Trazodone to 150mg.  Pharmacy is dispensing 100mg and instructed patient to take 1.5 tabs.    Per Dr. Lugo, patient is to take 1.5 tabs for now and see his primary care doctor to prescribe otherwise.

## 2017-02-10 NOTE — TELEPHONE ENCOUNTER
----- Message from Karissa Miller sent at 2/10/2017  4:14 PM CST -----  Contact: pt   ..Pt was returning nurse call     ..[phone] (home)

## 2017-03-01 ENCOUNTER — PROCEDURE VISIT (OUTPATIENT)
Dept: PULMONOLOGY | Facility: CLINIC | Age: 47
End: 2017-03-01
Payer: OTHER MISCELLANEOUS

## 2017-03-01 ENCOUNTER — TELEPHONE (OUTPATIENT)
Dept: PULMONOLOGY | Facility: CLINIC | Age: 47
End: 2017-03-01

## 2017-03-01 ENCOUNTER — OFFICE VISIT (OUTPATIENT)
Dept: PULMONOLOGY | Facility: CLINIC | Age: 47
End: 2017-03-01
Payer: OTHER MISCELLANEOUS

## 2017-03-01 VITALS
OXYGEN SATURATION: 97 % | HEIGHT: 67 IN | DIASTOLIC BLOOD PRESSURE: 74 MMHG | WEIGHT: 315 LBS | HEART RATE: 86 BPM | SYSTOLIC BLOOD PRESSURE: 126 MMHG | BODY MASS INDEX: 49.44 KG/M2

## 2017-03-01 DIAGNOSIS — R05.9 COUGH: Primary | ICD-10-CM

## 2017-03-01 DIAGNOSIS — R06.02 SOB (SHORTNESS OF BREATH): Primary | ICD-10-CM

## 2017-03-01 DIAGNOSIS — T59.91XA INHALATION OF NOXIOUS FUMES, ACCIDENTAL OR UNINTENTIONAL, INITIAL ENCOUNTER: ICD-10-CM

## 2017-03-01 PROCEDURE — 99999 PR PBB SHADOW E&M-EST. PATIENT-LVL III: CPT | Mod: PBBFAC,,, | Performed by: INTERNAL MEDICINE

## 2017-03-01 PROCEDURE — 99215 OFFICE O/P EST HI 40 MIN: CPT | Mod: 25,S$GLB,, | Performed by: INTERNAL MEDICINE

## 2017-03-01 PROCEDURE — 94060 EVALUATION OF WHEEZING: CPT | Mod: S$GLB,,, | Performed by: INTERNAL MEDICINE

## 2017-03-01 RX ORDER — PROMETHAZINE HYDROCHLORIDE AND DEXTROMETHORPHAN HYDROBROMIDE 6.25; 15 MG/5ML; MG/5ML
5 SYRUP ORAL EVERY 6 HOURS PRN
Qty: 473 ML | Refills: 5 | Status: SHIPPED | OUTPATIENT
Start: 2017-03-01 | End: 2017-06-13 | Stop reason: SDUPTHER

## 2017-03-01 RX ORDER — BUDESONIDE 0.5 MG/2ML
0.5 INHALANT ORAL 2 TIMES DAILY
Qty: 120 ML | Refills: 12 | Status: SHIPPED | OUTPATIENT
Start: 2017-03-01 | End: 2017-03-07 | Stop reason: SDUPTHER

## 2017-03-01 RX ORDER — MONTELUKAST SODIUM 10 MG/1
10 TABLET ORAL NIGHTLY
Qty: 30 TABLET | Refills: 11 | Status: SHIPPED | OUTPATIENT
Start: 2017-03-01 | End: 2017-03-27 | Stop reason: SDUPTHER

## 2017-03-01 RX ORDER — BENZONATATE 100 MG/1
100 CAPSULE ORAL 3 TIMES DAILY PRN
Qty: 90 CAPSULE | Refills: 2 | Status: SHIPPED | OUTPATIENT
Start: 2017-03-01 | End: 2017-03-27 | Stop reason: SDUPTHER

## 2017-03-01 RX ORDER — IPRATROPIUM BROMIDE AND ALBUTEROL SULFATE 2.5; .5 MG/3ML; MG/3ML
3 SOLUTION RESPIRATORY (INHALATION) EVERY 6 HOURS
Qty: 120 VIAL | Refills: 12 | Status: SHIPPED | OUTPATIENT
Start: 2017-03-01 | End: 2017-03-27 | Stop reason: SDUPTHER

## 2017-03-01 RX ORDER — ALBUTEROL SULFATE 90 UG/1
2 AEROSOL, METERED RESPIRATORY (INHALATION) EVERY 4 HOURS PRN
Qty: 18 G | Refills: 0 | Status: SHIPPED | OUTPATIENT
Start: 2017-03-01 | End: 2017-03-27 | Stop reason: SDUPTHER

## 2017-03-01 NOTE — PROGRESS NOTES
Pre and post bronchodilator spirometry.  Patient stated that he did not know if he could perform test, because he had not taken his bronchodilator medicine.  Patient was apprehensive during testing procedures and was reluctant to wear nose clip, because he felt he could not breath with it on.   Patient was instructed on importance of proper technique for pre and post testing and encouraged to complete testing trials.

## 2017-03-01 NOTE — MR AVS SNAPSHOT
[facility]  [address] [facility] [place] [address]  Phone: [phone]  Fax: [phone]                  Lamin Manjarrez   3/1/2017 2:00 PM   Office Visit    Description:  Male : 1970   Provider:  Matias Lugo MD   Department:  [facility]           Diagnoses this Visit        Comments    Cough    -  Primary     Inhalation of noxious fumes, accidental or unintentional, initial encounter                To Do List           Goals (5 Years of Data)     None      Follow-Up and Disposition     Return in about 6 months (around 2017) for PFT.       These Medications        Disp Refills Start End    promethazine-dextromethorphan (PROMETHAZINE-DM) 6.25-15 mg/5 mL Syrp 473 mL 5 3/1/2017     Take 5 mLs by mouth every 6 (six) hours as needed. Worker's comp - Oral    Pharmacy: [facility] [address] [place] [address] 0 [facility] [address] Ph #: [phone]       montelukast (SINGULAIR) 10 mg tablet 30 tablet 11 3/1/2017     Take 1 tablet (10 mg total) by mouth every evening. Worker's comp - Oral    Pharmacy: [facility] [address] [place] [address] [place] -   S. Ph #: [phone]       budesonide (PULMICORT) 0.5 mg/2 mL nebulizer solution 120 mL 12 3/1/2017 3/1/2018    Take 2 mLs (0.5 mg total) by nebulization 2 (two) times daily. Wash out mouth after using.Worker's comp - Nebulization    Pharmacy: [facility] [address] [place] [address]   S. Ph #: [phone]       Notes to Pharmacy: Dispense # 60 vials of 0.5mg/2ml    benzonatate (TESSALON) 100 MG capsule 90 capsule 2 3/1/2017     Take 1 capsule (100 mg total) by mouth 3 (three) times daily as needed for Cough. Worker's comp - Oral    Pharmacy: [facility] [address] [place], [place] [address] - 0  S. Ph #: [phone]       albuterol-ipratropium 2.5mg-0.5mg/3mL (DUO-NEB) 0.5 mg-3 mg(2.5 mg base)/3 mL nebulizer solution 120 vial 12 3/1/2017 3/1/2018    Take 3 mLs by nebulization every 6 (six) hours. Worker's  comp - Nebulization    Pharmacy: [facility] [address] [place] [address]  [address]. Ph #: [phone]       albuterol (VENTOLIN HFA) 90 mcg/actuation inhaler 18 g 0 3/1/2017 3/1/2018    Inhale 2 puffs into the lungs every 4 (four) hours as needed for Shortness of Breath. Worker's comp - Inhalation    Pharmacy: [facility] [address] [place] [address]  [address]. Ph #: [phone]       inhalation device (BREATHERITE VALVED MDI CHAMBER) 1 Device prn 3/1/2017     Use as directed for inhalation. Worker's comp    Pharmacy: [facility] [address] [place] [address]  [address]. Ph #: [phone]         [facility]     [facility] - 24/7 Assistance  Registered nurses in the [facility] provide clinical advisement, health education, appointment booking, and other advisory services.  Call for this free service at [phone].             Medications           Message regarding Medications     Verify the changes and/or additions to your medication regime listed below are the same as discussed with your clinician today.  If any of these changes or additions are incorrect, please notify your healthcare provider.        START taking these NEW medications        Refills    inhalation device (BREATHERITE VALVED MDI CHAMBER) prn    Sig: Use as directed for inhalation. Worker's comp    Class: Print      CHANGE how you are taking these medications     Start Taking Instead of    montelukast (SINGULAIR) 10 mg tablet montelukast (SINGULAIR) 10 mg tablet    Dosage:  Take 1 tablet (10 mg total) by mouth every evening. Worker's comp Dosage:  Take 10 mg by mouth every evening.    Reason for Change:  Reorder     benzonatate (TESSALON) 100 MG capsule benzonatate (TESSALON) 100 MG capsule    Dosage:  Take 1 capsule (100 mg total) by mouth 3 (three) times daily as needed for Cough. Worker's comp Dosage:  Take 100 mg by mouth 3 (three) times daily as needed for Cough.    Reason for Change:   Reorder     albuterol (VENTOLIN HFA) 90 mcg/actuation inhaler ALBUTEROL SULFATE (VENTOLIN HFA INHL)    Dosage:  Inhale 2 puffs into the lungs every 4 (four) hours as needed for Shortness of Breath. Worker's comp Dosage:  Inhale into the lungs.    Reason for Change:  Reorder       STOP taking these medications     trazodone (DESYREL) 100 MG tablet Take 1.5 tablets (150 mg total) by mouth nightly as needed for Insomnia. Worker's comp           Verify that the below list of medications is an accurate representation of the medications you are currently taking.  If none reported, the list may be blank. If incorrect, please contact your healthcare provider. Carry this list with you in case of emergency.           Current Medications     acetaminophen (TYLENOL) 500 MG tablet Take 500 mg by mouth every 6 (six) hours as needed for Pain.    albuterol (VENTOLIN HFA) 90 mcg/actuation inhaler Inhale 2 puffs into the lungs every 4 (four) hours as needed for Shortness of Breath. Worker's comp    albuterol-ipratropium 2.5mg-0.5mg/3mL (DUO-NEB) 0.5 mg-3 mg(2.5 mg base)/3 mL nebulizer solution Take 3 mLs by nebulization every 6 (six) hours. Worker's comp    benzonatate (TESSALON) 100 MG capsule Take 1 capsule (100 mg total) by mouth 3 (three) times daily as needed for Cough. Worker's comp    budesonide (PULMICORT) 0.5 mg/2 mL nebulizer solution Take 2 mLs (0.5 mg total) by nebulization 2 (two) times daily. Wash out mouth after using.Worker's comp    inhalation device (BREATHERITE VALVED MDI CHAMBER) Use as directed for inhalation. Worker's comp    montelukast (SINGULAIR) 10 mg tablet Take 1 tablet (10 mg total) by mouth every evening. Worker's comp    promethazine-dextromethorphan (PROMETHAZINE-DM) 6.25-15 mg/5 mL Syrp Take 5 mLs by mouth every 6 (six) hours as needed. Worker's comp           Clinical Reference Information           Your Vitals Were     BP                   126/74           Blood Pressure          Most Recent Value     BP  126/74      Allergies as of 3/1/2017     Toradol [Ketorolac]      Immunizations Administered on Date of Encounter - 3/1/2017     None      Orders Placed During Today's Visit     Future Labs/Procedures Expected by Expires    Complete PFT with bronchodilator  As directed 3/1/2018      [facility] Sign-Up     Activating your [facility] account is as easy as 1-2-3!     1) Visit [email or web address], select Sign Up Now, enter this activation code and your date of birth, then select Next.  [email or web address]  Expires: 3/4/2017  1:00 PM      2) Create a username and password to use when you visit [facility] in the future and select a security question in case you lose your password and select Next.    3) Enter your e-mail address and click Sign Up!    Additional Information  If you have questions, please e-mail [email or web address] or call [phone] to talk to our [facility] staff. Remember, [facility] is NOT to be used for urgent needs. For medical emergencies, dial 911.         Instructions    Use Budesonide jet nebs twice a day - no more or no less.  Use Albuterol prior to budesonide at least twice a day. May use albuterol every 4 - 6 hours if needed for shortness of breath, cough or chest congestion         Language Assistance Services     ATTENTION: Language assistance services are available, free of charge. Please call [phone].      ATENCIÓN: Si habla español, tiene a bull disposición servicios gratuitos de asistencia lingüística. Llame al [phone].     [facility] Ý: N?u b?n nói Ti?ng Vi?t, có các d?ch v? h? tr? ngôn ng? mi?n phí dành cho b?n. G?i s? [phone].         [facility] complies with applicable Federal civil rights laws and does not discriminate on the basis of race, color, national origin, age, disability, or sex.

## 2017-03-01 NOTE — TELEPHONE ENCOUNTER
----- Message from Cindi Monroy sent at 3/1/2017  3:31 PM CST -----  Contact: [employer]  Wants to know when  PFT results will be available  can be reached [phone]

## 2017-03-01 NOTE — TELEPHONE ENCOUNTER
Done  The following orders duplicate orders from other encounters (within 24 hours):  SPIROMETRY WITH/WITHOUT BRONCHODILATOR [PFT18]  No.1 Ordered by Matias Lugo MD on Wed Mar 1, 2017 1:33 PM  Click here to update recipient lists  No.2 Ordered by Matias Lugo MD on Wed Mar 1, 2017 1:33 PM  Click here to update recipient lists

## 2017-03-01 NOTE — PROGRESS NOTES
Subjective:       Patient ID: Lamin Manjarrez is a 47 y.o. male.    Chief Complaint: He       No chief complaint on file.    HPI   This is a followup examination for this patient with the history of fumes/dust   exposure.  He reports that since his last office visit, he has had significant   improvement in his symptoms.  He is using his bronchodilators on a regular   basis.  He reports no side effects from his medications.  He still has shortness   of breath and dyspnea, occasional cough.  He had difficulty performing   pulmonary function studies today due to coughing.  He has had some worsening of   his sinus symptoms related to pollen in the springtime, but otherwise he is   doing fairly well.  He reports no side effects from his present medications.    Since his last office visit, he reports no other new medical problems or medical   illnesses.    He has not yet returned to work.      NOLAN/TN  dd: 03/02/2017 20:11:08 (CST)  td: 03/03/2017 04:27:09 (CST)  Doc ID   #[id number]  Job ID #[id number]    CC:     [id number]        No past medical history on file.  No past surgical history on file.  Social History     Social History    Marital status:      Spouse name: N/A    Number of children: N/A    Years of education: N/A     Occupational History    Not on file.     Social History Main Topics    Smoking status: Never Smoker    Smokeless tobacco: Not on file    Alcohol use Not on file    Drug use: Not on file    Sexual activity: Not on file     Other Topics Concern    Not on file     Social History Narrative     Review of Systems   Constitutional: Positive for fatigue. Negative for fever.   HENT: Positive for postnasal drip and rhinorrhea. Negative for congestion.    Respiratory: Positive for cough, sputum production, shortness of breath, dyspnea on extertion, use of rescue inhaler and Paroxysmal Nocturnal Dyspnea.    Cardiovascular: Negative for chest pain, palpitations and leg swelling.   Skin: Negative for  rash.   Gastrointestinal: Negative for nausea and abdominal pain.   Neurological: Negative for dizziness, syncope, weakness and light-headedness.   Hematological: Negative for adenopathy. Does not bruise/bleed easily.   Psychiatric/Behavioral: Negative for sleep disturbance. The patient is not nervous/anxious.        Objective:      Physical Exam   Constitutional: He is oriented to person, place, and time. He appears well-developed and well-nourished.   HENT:   Head: Normocephalic and atraumatic.   Eyes: Conjunctivae are normal. Pupils are equal, round, and reactive to light.   Neck: Neck supple. No JVD present. No tracheal deviation present. No thyromegaly present.   Cardiovascular: Normal rate, regular rhythm and normal heart sounds.    Pulmonary/Chest: Effort normal and breath sounds normal. He has no wheezes. He has no rales.   Abdominal: Soft.   Musculoskeletal: Normal range of motion.   Lymphadenopathy:     He has no cervical adenopathy.   Neurological: He is alert and oriented to person, place, and time.   Skin: Skin is warm and dry.   Nursing note and vitals reviewed.    Personal Diagnostic Review  Chest x-ray: hyperinflation    Spirometry : coughing    No flowsheet data found.      Assessment:       1. Cough    2. Inhalation of noxious fumes, accidental or unintentional, initial encounter        Outpatient Encounter Prescriptions as of 3/1/2017   Medication Sig Dispense Refill    acetaminophen (TYLENOL) 500 MG tablet Take 500 mg by mouth every 6 (six) hours as needed for Pain.      albuterol (VENTOLIN HFA) 90 mcg/actuation inhaler Inhale 2 puffs into the lungs every 4 (four) hours as needed for Shortness of Breath. Worker's comp 18 g 0    albuterol-ipratropium 2.5mg-0.5mg/3mL (DUO-NEB) 0.5 mg-3 mg(2.5 mg base)/3 mL nebulizer solution Take 3 mLs by nebulization every 6 (six) hours. Worker's comp 120 vial 12    budesonide (PULMICORT) 0.5 mg/2 mL nebulizer solution Take 2 mLs (0.5 mg total) by nebulization  2 (two) times daily. Wash out mouth after using.Worker's comp 120 mL 12    montelukast (SINGULAIR) 10 mg tablet Take 1 tablet (10 mg total) by mouth every evening. Worker's comp 30 tablet 11    promethazine-dextromethorphan (PROMETHAZINE-DM) 6.25-15 mg/5 mL Syrp Take 5 mLs by mouth every 6 (six) hours as needed. Worker's comp 473 mL 5    [DISCONTINUED] ALBUTEROL SULFATE (VENTOLIN HFA INHL) Inhale into the lungs.      [DISCONTINUED] albuterol-ipratropium 2.5mg-0.5mg/3mL (DUO-NEB) 0.5 mg-3 mg(2.5 mg base)/3 mL nebulizer solution Take 3 mLs by nebulization every 6 (six) hours. Worker's comp 120 vial 12    [DISCONTINUED] budesonide (PULMICORT) 0.5 mg/2 mL nebulizer solution Take 2 mLs (0.5 mg total) by nebulization 2 (two) times daily. Wash out mouth after using.Worker's comp 120 mL 12    [DISCONTINUED] montelukast (SINGULAIR) 10 mg tablet Take 10 mg by mouth every evening.      [DISCONTINUED] promethazine-dextromethorphan (PROMETHAZINE-DM) 6.25-15 mg/5 mL Syrp Take 5 mLs by mouth every 6 (six) hours as needed. Worker's comp 473 mL 5    benzonatate (TESSALON) 100 MG capsule Take 1 capsule (100 mg total) by mouth 3 (three) times daily as needed for Cough. Worker's comp 90 capsule 2    inhalation device (BREATHERITE VALVED MDI CHAMBER) Use as directed for inhalation. Worker's comp 1 Device prn    [DISCONTINUED] benzonatate (TESSALON) 100 MG capsule Take 100 mg by mouth 3 (three) times daily as needed for Cough.      [DISCONTINUED] trazodone (DESYREL) 100 MG tablet Take 1.5 tablets (150 mg total) by mouth nightly as needed for Insomnia. Worker's comp 30 tablet 11     No facility-administered encounter medications on file as of 3/1/2017.      Orders Placed This Encounter   Procedures    Complete PFT with bronchodilator     Standing Status:   Future     Standing Expiration Date:   3/1/2018     Plan:       Requested Prescriptions     Signed Prescriptions Disp Refills    promethazine-dextromethorphan  (PROMETHAZINE-DM) 6.25-15 mg/5 mL Syrp 473 mL 5     Sig: Take 5 mLs by mouth every 6 (six) hours as needed. Worker's comp    montelukast (SINGULAIR) 10 mg tablet 30 tablet 11     Sig: Take 1 tablet (10 mg total) by mouth every evening. Worker's comp    budesonide (PULMICORT) 0.5 mg/2 mL nebulizer solution 120 mL 12     Sig: Take 2 mLs (0.5 mg total) by nebulization 2 (two) times daily. Wash out mouth after using.Worker's comp    benzonatate (TESSALON) 100 MG capsule 90 capsule 2     Sig: Take 1 capsule (100 mg total) by mouth 3 (three) times daily as needed for Cough. Worker's comp    albuterol-ipratropium 2.5mg-0.5mg/3mL (DUO-NEB) 0.5 mg-3 mg(2.5 mg base)/3 mL nebulizer solution 120 vial 12     Sig: Take 3 mLs by nebulization every 6 (six) hours. Worker's comp    albuterol (VENTOLIN HFA) 90 mcg/actuation inhaler 18 g 0     Sig: Inhale 2 puffs into the lungs every 4 (four) hours as needed for Shortness of Breath. Worker's comp    inhalation device (BREATHERITE VALVED MDI CHAMBER) 1 Device prn     Sig: Use as directed for inhalation. Worker's comp     Cough  -     benzonatate (TESSALON) 100 MG capsule; Take 1 capsule (100 mg total) by mouth 3 (three) times daily as needed for Cough. Worker's comp  Dispense: 90 capsule; Refill: 2    Inhalation of noxious fumes, accidental or unintentional, initial encounter  -     promethazine-dextromethorphan (PROMETHAZINE-DM) 6.25-15 mg/5 mL Syrp; Take 5 mLs by mouth every 6 (six) hours as needed. Worker's comp  Dispense: 473 mL; Refill: 5  -     montelukast (SINGULAIR) 10 mg tablet; Take 1 tablet (10 mg total) by mouth every evening. Worker's comp  Dispense: 30 tablet; Refill: 11  -     budesonide (PULMICORT) 0.5 mg/2 mL nebulizer solution; Take 2 mLs (0.5 mg total) by nebulization 2 (two) times daily. Wash out mouth after using.Worker's comp  Dispense: 120 mL; Refill: 12  -     albuterol-ipratropium 2.5mg-0.5mg/3mL (DUO-NEB) 0.5 mg-3 mg(2.5 mg base)/3 mL nebulizer solution;  Take 3 mLs by nebulization every 6 (six) hours. Worker's comp  Dispense: 120 vial; Refill: 12  -     albuterol (VENTOLIN HFA) 90 mcg/actuation inhaler; Inhale 2 puffs into the lungs every 4 (four) hours as needed for Shortness of Breath. Worker's comp  Dispense: 18 g; Refill: 0  -     Complete PFT with bronchodilator; Future    Other orders  -     inhalation device (BREATHERITE VALVED MDI CHAMBER); Use as directed for inhalation. Worker's comp  Dispense: 1 Device; Refill: prn         Return in about 6 months (around 9/1/2017) for PFT.    MEDICAL DECISION MAKING: Moderate to high complexity.  Overall, the multiple problems listed are of moderate to high severity that may impact quality of life and activities of daily living. Side effects of medications, treatment plan as well as options and alternatives reviewed and discussed with patient. There was counseling of patient concerning these issues.    Total time spent in face to face counseling and coordination of care - 40  minutes over 50% of time was used in discussion of prognosis, risks, benefits of treatment, instructions and compliance with regimen . Discussion with other physicians or health care providers (DME, NP, pharmacy, respiratory therapy) occurred.

## 2017-03-07 DIAGNOSIS — T59.91XA INHALATION OF NOXIOUS FUMES, ACCIDENTAL OR UNINTENTIONAL, INITIAL ENCOUNTER: ICD-10-CM

## 2017-03-07 RX ORDER — BUDESONIDE 0.5 MG/2ML
0.5 INHALANT ORAL 2 TIMES DAILY
Qty: 120 ML | Refills: 12 | Status: SHIPPED | OUTPATIENT
Start: 2017-03-07 | End: 2017-11-21 | Stop reason: SDUPTHER

## 2017-03-14 ENCOUNTER — TELEPHONE (OUTPATIENT)
Dept: PULMONOLOGY | Facility: CLINIC | Age: 47
End: 2017-03-14

## 2017-03-14 NOTE — TELEPHONE ENCOUNTER
----- Message from Laila Tovar LPN sent at 3/13/2017  3:08 PM CDT -----      ----- Message -----     From: Nereyda Santana     Sent: 3/13/2017   2:55 PM       To: [facility] Staff    Patient states he walk into a store and they had just mopped the floor and what they mopped with, he could not breathe.   It took his breath away.  He just wanted to let Dr Lugo know.  Call him at [phone].                                      spain

## 2017-03-14 NOTE — TELEPHONE ENCOUNTER
----- Message from Cindi Monroy sent at 3/14/2017  4:32 PM CDT -----  Contact: pt  Pt returning missed call, pt can be reached at [phone]///thxMW

## 2017-03-27 DIAGNOSIS — R05.9 COUGH: ICD-10-CM

## 2017-03-27 DIAGNOSIS — T59.91XA INHALATION OF NOXIOUS FUMES, ACCIDENTAL OR UNINTENTIONAL, INITIAL ENCOUNTER: ICD-10-CM

## 2017-03-27 RX ORDER — MONTELUKAST SODIUM 10 MG/1
10 TABLET ORAL NIGHTLY
Qty: 90 TABLET | Refills: 3 | Status: SHIPPED | OUTPATIENT
Start: 2017-03-27 | End: 2017-12-06 | Stop reason: SDUPTHER

## 2017-03-27 RX ORDER — ALBUTEROL SULFATE 90 UG/1
2 AEROSOL, METERED RESPIRATORY (INHALATION) EVERY 4 HOURS PRN
Qty: 3 INHALER | Refills: 3 | Status: SHIPPED | OUTPATIENT
Start: 2017-03-27 | End: 2017-10-20 | Stop reason: SDUPTHER

## 2017-03-27 RX ORDER — BENZONATATE 100 MG/1
100 CAPSULE ORAL 3 TIMES DAILY PRN
Qty: 90 CAPSULE | Refills: 2 | Status: ON HOLD | OUTPATIENT
Start: 2017-03-27 | End: 2018-04-12 | Stop reason: CLARIF

## 2017-03-27 RX ORDER — IPRATROPIUM BROMIDE AND ALBUTEROL SULFATE 2.5; .5 MG/3ML; MG/3ML
3 SOLUTION RESPIRATORY (INHALATION) EVERY 6 HOURS
Qty: 1080 ML | Refills: 3 | Status: SHIPPED | OUTPATIENT
Start: 2017-03-27 | End: 2017-10-20 | Stop reason: SDUPTHER

## 2017-03-27 NOTE — TELEPHONE ENCOUNTER
----- Message from Kerry Keenan sent at 3/27/2017 11:47 AM CDT -----  Contact: pt  States he got his budesonide medicine and needs all of his prescription sent to [facility]. Please fax to [phone] as soon as possible. Please call pt at [phone]. Thank you

## 2017-03-28 LAB
PRE FEV1 FVC: 85.72 % (ref 70.6–91.43)
PRE FEV1: 2.56 L (ref 2.36–3.89)
PRE FVC: 2.99 L (ref 2.99–4.71)
PRE PEF: 9.5 L/S (ref 5.87–10.74)

## 2017-05-23 ENCOUNTER — TELEPHONE (OUTPATIENT)
Dept: PULMONOLOGY | Facility: CLINIC | Age: 47
End: 2017-05-23

## 2017-05-23 NOTE — TELEPHONE ENCOUNTER
----- Message from Kerry Keenan sent at 5/23/2017  2:57 PM CDT -----  Contact: pt  States he needs a refill on promethazine DM and his tradozone . Pt uses [facility] in [facility] [phone]. Please call pt at [phone]. Thank you

## 2017-06-13 DIAGNOSIS — T59.91XA INHALATION OF NOXIOUS FUMES, ACCIDENTAL OR UNINTENTIONAL, INITIAL ENCOUNTER: ICD-10-CM

## 2017-06-13 RX ORDER — TRAZODONE HYDROCHLORIDE 100 MG/1
100 TABLET ORAL NIGHTLY
Qty: 30 TABLET | Refills: 11 | Status: SHIPPED | OUTPATIENT
Start: 2017-06-13 | End: 2018-03-13

## 2017-06-13 RX ORDER — PROMETHAZINE HYDROCHLORIDE AND DEXTROMETHORPHAN HYDROBROMIDE 6.25; 15 MG/5ML; MG/5ML
5 SYRUP ORAL EVERY 6 HOURS PRN
Qty: 473 ML | Refills: 5 | Status: SHIPPED | OUTPATIENT
Start: 2017-06-13 | End: 2017-10-20 | Stop reason: SDUPTHER

## 2017-06-13 NOTE — TELEPHONE ENCOUNTER
----- Message from Oneyda Goldsmith sent at 6/13/2017  1:25 PM CDT -----  Contact: [phone]   Pt would like refills called in on Rx medications.  Please call back @ [phone].  Thanks-[facility]         Pt Uses:  [facility] in [place]- Ph:[phone]

## 2017-09-20 NOTE — PATIENT INSTRUCTIONS
Use Budesonide jet nebs twice a day - no more or no less.  Use Albuterol prior to budesonide at least twice a day. May use albuterol every 4 - 6 hours if needed for shortness of breath, cough or chest congestion    
APER

## 2017-10-20 DIAGNOSIS — T59.91XA INHALATION OF NOXIOUS FUMES, ACCIDENTAL OR UNINTENTIONAL, INITIAL ENCOUNTER: ICD-10-CM

## 2017-10-20 RX ORDER — ALBUTEROL SULFATE 90 UG/1
2 AEROSOL, METERED RESPIRATORY (INHALATION) EVERY 4 HOURS PRN
Qty: 3 INHALER | Refills: 3 | Status: SHIPPED | OUTPATIENT
Start: 2017-10-20 | End: 2017-11-19 | Stop reason: SDUPTHER

## 2017-10-20 RX ORDER — PROMETHAZINE HYDROCHLORIDE AND DEXTROMETHORPHAN HYDROBROMIDE 6.25; 15 MG/5ML; MG/5ML
5 SYRUP ORAL EVERY 6 HOURS PRN
Qty: 473 ML | Refills: 5 | Status: SHIPPED | OUTPATIENT
Start: 2017-10-20 | End: 2018-04-18 | Stop reason: SDUPTHER

## 2017-10-20 RX ORDER — IPRATROPIUM BROMIDE AND ALBUTEROL SULFATE 2.5; .5 MG/3ML; MG/3ML
3 SOLUTION RESPIRATORY (INHALATION) EVERY 6 HOURS
Qty: 1080 ML | Refills: 3 | Status: SHIPPED | OUTPATIENT
Start: 2017-10-20 | End: 2017-12-06 | Stop reason: SDUPTHER

## 2017-10-20 NOTE — TELEPHONE ENCOUNTER
----- Message from Celestina Corrigan sent at 10/20/2017 12:07 PM CDT -----  Contact: patient  Mr. Manjarrez stated he missed his last appt and he needed a refill on his meds, he stated the pharmacy sent a request but they never received an answer and he is out of his meds. He uses [facility] in [place], [place] # [phone]. He states he really feels terrible its urgent.

## 2017-11-19 DIAGNOSIS — T59.91XA INHALATION OF NOXIOUS FUMES, ACCIDENTAL OR UNINTENTIONAL, INITIAL ENCOUNTER: ICD-10-CM

## 2017-11-19 RX ORDER — ALBUTEROL SULFATE 90 UG/1
AEROSOL, METERED RESPIRATORY (INHALATION)
Qty: 18 EACH | Refills: 0 | Status: SHIPPED | OUTPATIENT
Start: 2017-11-19 | End: 2017-12-06 | Stop reason: SDUPTHER

## 2017-11-21 DIAGNOSIS — T59.91XA INHALATION OF NOXIOUS FUMES, ACCIDENTAL OR UNINTENTIONAL, INITIAL ENCOUNTER: ICD-10-CM

## 2017-11-21 RX ORDER — BUDESONIDE 0.5 MG/2ML
0.5 INHALANT ORAL 2 TIMES DAILY
Qty: 120 ML | Refills: 12 | Status: SHIPPED | OUTPATIENT
Start: 2017-11-21 | End: 2017-11-24 | Stop reason: SDUPTHER

## 2017-11-21 RX ORDER — BUDESONIDE 0.5 MG/2ML
0.5 INHALANT ORAL 2 TIMES DAILY
Qty: 120 ML | Refills: 12 | Status: SHIPPED | OUTPATIENT
Start: 2017-11-21 | End: 2017-11-21 | Stop reason: SDUPTHER

## 2017-11-24 DIAGNOSIS — T59.91XA INHALATION OF NOXIOUS FUMES, ACCIDENTAL OR UNINTENTIONAL, INITIAL ENCOUNTER: ICD-10-CM

## 2017-11-24 RX ORDER — BUDESONIDE 0.5 MG/2ML
0.5 INHALANT ORAL 2 TIMES DAILY
Qty: 120 ML | Refills: 12 | Status: SHIPPED | OUTPATIENT
Start: 2017-11-24 | End: 2017-12-06 | Stop reason: ALTCHOICE

## 2017-11-29 ENCOUNTER — TELEPHONE (OUTPATIENT)
Dept: PULMONOLOGY | Facility: CLINIC | Age: 47
End: 2017-11-29

## 2017-11-29 DIAGNOSIS — R05.9 COUGH: Primary | ICD-10-CM

## 2017-11-29 NOTE — TELEPHONE ENCOUNTER
Offered pt appt for tomorrow with Dr. Lugo. Pt states he does not have transportation and will probably just go back to local ER. Has already been there twice.  Pt still sob and coughing.

## 2017-11-29 NOTE — TELEPHONE ENCOUNTER
----- Message from Maggy Crain sent at 11/29/2017  1:05 PM CST -----  Contact: Patient  Patient called to speak with the nurse; he stated he has been having some bad issue with his condition. Now also coughing all night long, can't really breath at night when he is sleeping, acid reflux, and more. He can be contacted at [phone].    Thanks,  Maggy

## 2017-12-06 ENCOUNTER — OFFICE VISIT (OUTPATIENT)
Dept: PULMONOLOGY | Facility: CLINIC | Age: 47
End: 2017-12-06
Payer: OTHER MISCELLANEOUS

## 2017-12-06 VITALS
HEIGHT: 67 IN | WEIGHT: 315 LBS | DIASTOLIC BLOOD PRESSURE: 80 MMHG | OXYGEN SATURATION: 97 % | SYSTOLIC BLOOD PRESSURE: 140 MMHG | HEART RATE: 82 BPM | BODY MASS INDEX: 49.44 KG/M2

## 2017-12-06 DIAGNOSIS — F41.8 ANXIETY WITH DEPRESSION: ICD-10-CM

## 2017-12-06 DIAGNOSIS — T59.91XD INHALATION OF NOXIOUS FUMES, ACCIDENTAL OR UNINTENTIONAL, SUBSEQUENT ENCOUNTER: Primary | ICD-10-CM

## 2017-12-06 DIAGNOSIS — J45.21 MILD INTERMITTENT ASTHMA WITH ACUTE EXACERBATION: ICD-10-CM

## 2017-12-06 DIAGNOSIS — K21.9 GASTROESOPHAGEAL REFLUX DISEASE, ESOPHAGITIS PRESENCE NOT SPECIFIED: ICD-10-CM

## 2017-12-06 DIAGNOSIS — R05.9 COUGH: ICD-10-CM

## 2017-12-06 PROCEDURE — 99999 PR PBB SHADOW E&M-EST. PATIENT-LVL V: CPT | Mod: PBBFAC,,, | Performed by: INTERNAL MEDICINE

## 2017-12-06 PROCEDURE — 96372 THER/PROPH/DIAG INJ SC/IM: CPT | Mod: S$GLB,,, | Performed by: INTERNAL MEDICINE

## 2017-12-06 PROCEDURE — 99215 OFFICE O/P EST HI 40 MIN: CPT | Mod: 25,S$GLB,, | Performed by: INTERNAL MEDICINE

## 2017-12-06 RX ORDER — PREDNISONE 20 MG/1
TABLET ORAL
Qty: 20 TABLET | Refills: 1 | Status: SHIPPED | OUTPATIENT
Start: 2017-12-06

## 2017-12-06 RX ORDER — BUDESONIDE AND FORMOTEROL FUMARATE DIHYDRATE 160; 4.5 UG/1; UG/1
2 AEROSOL RESPIRATORY (INHALATION) EVERY 12 HOURS
Qty: 3 INHALER | Refills: 4 | Status: SHIPPED | OUTPATIENT
Start: 2017-12-06 | End: 2018-12-06

## 2017-12-06 RX ORDER — PROMETHAZINE HYDROCHLORIDE AND DEXTROMETHORPHAN HYDROBROMIDE 6.25; 15 MG/5ML; MG/5ML
5 SYRUP ORAL EVERY 6 HOURS PRN
Qty: 473 ML | Refills: 5 | Status: ON HOLD | OUTPATIENT
Start: 2017-12-06 | End: 2018-04-12 | Stop reason: CLARIF

## 2017-12-06 RX ORDER — SERTRALINE HYDROCHLORIDE 50 MG/1
50 TABLET, FILM COATED ORAL DAILY
Qty: 30 TABLET | Refills: 11 | Status: SHIPPED | OUTPATIENT
Start: 2017-12-06 | End: 2018-12-06

## 2017-12-06 RX ORDER — CROMOLYN SODIUM 20 MG/2ML
20 INHALANT INTRABRONCHIAL 3 TIMES DAILY
Qty: 100 ML | Refills: 11 | Status: SHIPPED | OUTPATIENT
Start: 2017-12-06

## 2017-12-06 RX ORDER — AZITHROMYCIN 250 MG/1
250 TABLET, FILM COATED ORAL DAILY
Qty: 6 TABLET | Refills: 1 | Status: ON HOLD | OUTPATIENT
Start: 2017-12-06 | End: 2018-04-12 | Stop reason: CLARIF

## 2017-12-06 RX ORDER — MONTELUKAST SODIUM 10 MG/1
10 TABLET ORAL NIGHTLY
Qty: 90 TABLET | Refills: 3 | Status: SHIPPED | OUTPATIENT
Start: 2017-12-06 | End: 2018-04-19 | Stop reason: SDUPTHER

## 2017-12-06 RX ORDER — ALBUTEROL SULFATE 90 UG/1
2 AEROSOL, METERED RESPIRATORY (INHALATION) EVERY 4 HOURS PRN
Qty: 3 INHALER | Refills: 4 | Status: SHIPPED | OUTPATIENT
Start: 2017-12-06

## 2017-12-06 RX ORDER — HYDROCODONE POLISTIREX AND CHLORPHENIRAMINE POLISTIREX 10; 8 MG/5ML; MG/5ML
5 SUSPENSION, EXTENDED RELEASE ORAL EVERY 12 HOURS PRN
COMMUNITY
End: 2017-12-06 | Stop reason: ALTCHOICE

## 2017-12-06 RX ORDER — IPRATROPIUM BROMIDE AND ALBUTEROL SULFATE 2.5; .5 MG/3ML; MG/3ML
3 SOLUTION RESPIRATORY (INHALATION) EVERY 6 HOURS
Qty: 1080 ML | Refills: 3 | Status: SHIPPED | OUTPATIENT
Start: 2017-12-06 | End: 2018-11-09 | Stop reason: SDUPTHER

## 2017-12-06 RX ORDER — FAMOTIDINE 40 MG/1
40 TABLET, FILM COATED ORAL DAILY
Qty: 30 TABLET | Refills: 11 | Status: SHIPPED | OUTPATIENT
Start: 2017-12-06 | End: 2018-12-06

## 2017-12-06 RX ORDER — TRIAMCINOLONE ACETONIDE 40 MG/ML
80 INJECTION, SUSPENSION INTRA-ARTICULAR; INTRAMUSCULAR
Status: COMPLETED | OUTPATIENT
Start: 2017-12-06 | End: 2017-12-06

## 2017-12-06 RX ADMIN — TRIAMCINOLONE ACETONIDE 80 MG: 40 INJECTION, SUSPENSION INTRA-ARTICULAR; INTRAMUSCULAR at 12:12

## 2017-12-06 NOTE — PROGRESS NOTES
Unable to work do to his current state. Needs some type of letter so that Workers comp can pay for specialist he needs to see. He says his condition is getting worse to wear he has been throwing up blood. He is unable to work and needs some type of letter stating the severity of his condition.
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
## 2017-12-06 NOTE — LETTER
December 6, 2017    [facility]  [address] [facility], [address]  [place] 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    I have placed a referral to a GI specialist for GI reflux.    If you have any questions or concerns, please don't hesitate to call.    Sincerely,          Matias Lugo MD

## 2017-12-06 NOTE — PATIENT INSTRUCTIONS
Cromolyn Sodium inhalation solution  What is this medicine?  CROMOLYN SODIUM (KROE samira maximino JULIUS luciano um) helps reduce inflammation. This medicine is used to treat the symptoms of asthma. It is also used to prevent bronchospasm from exercise or irritants. Never use this medicine to treat an acute asthma attack.  How should I use this medicine?  Use this medicine in a power-driven nebulizer with an adequate airflow rate. The nebulizer must have a suitable face mask or mouthpiece. Nebulizers make a liquid into an aerosol that you breathe in through your mouth or your mouth and nose into your lungs. You will be taught how to use your nebulizer. Do not take this medicine as a solution by mouth. Follow the directions on your prescription label. Do not take your medicine more often than directed. Do not stop taking except on your doctor's advice.  Talk to your pediatrician regarding the use of this medicine in children. While this drug may be prescribed for children as young as 2 years old for selected conditions, precautions do apply.  What side effects may I notice from receiving this medicine?  Side effects that you should report to your doctor or health care professional as soon as possible:  · allergic reactions like skin rash, itching or hives, swelling of the face, lips, or tongue  · breathing problems  · dizziness  · fever  · infection  · joint pain, swelling  · redness, blistering, peeling or loosening of the skin, including inside the mouth  · unusually weak or tired  Side effects that usually do not require medical attention (report to your doctor or health care professional if they continue or are bothersome):  · bad taste  · cough  · nose bleed, irritation  · stomach upset, nausea  · stuffy nose, sneezing  What may interact with this medicine?  Interactions are not expected. Do not mix other medicines in the nebulizer with this one, unless advised by your doctor to do so.  What if I miss a dose?  If you miss a  dose, take it as soon as you can. If it is almost time for your next dose, take only that dose. Do not take double or extra doses.  Where should I keep my medicine?  Keep out of the reach of children.  Store at room temperature between 20 and 25 degrees C (68 and 77 degrees F). Protect from light. Do not use if it becomes cloudy or discolored. Store ampules in foil pouch until ready for use. Throw away any unused medicine after the expiration date.  What should I tell my health care provider before I take this medicine?  They need to know if you have any of these conditions:  · kidney disease  · liver disease  · an unusual or allergic reaction to cromolyn, other medicines, foods, dyes, or preservatives  · pregnant or trying to get pregnant  · breast-feeding  What should I watch for while using this medicine?  Visit your doctor or health care professional for regular checks on your progress. Tell your doctor or health care professional if your symptoms do not start to get better. If your symptoms get worse or if you need your short-acting inhalers more often, call your doctor right away.  If you get a bitter or unpleasant taste in your mouth, gargle or rinse your mouth after you use this medicine.  If you use this medicine to prevent a bronchospasm from exercise or an irritant, use it shortly before exposure.  NOTE:This sheet is a summary. It may not cover all possible information. If you have questions about this medicine, talk to your doctor, pharmacist, or health care provider. Copyright© 2017 [employer]        Famotidine tablets or gelcaps  What is this medicine?  FAMOTIDINE (fa RILEY green) is a type of antihistamine that blocks the release of stomach acid. It is used to treat stomach or intestinal ulcers. It can also relieve heartburn from acid reflux.  How should I use this medicine?  Take this medicine by mouth with a glass of water. Follow the directions on the prescription label. If you only take this  medicine once a day, take it at bedtime. Take your doses at regular intervals. Do not take your medicine more often than directed.  Talk to your pediatrician regarding the use of this medicine in children. Special care may be needed.  What side effects may I notice from receiving this medicine?  Side effects that you should report to your doctor or health care professional as soon as possible:  · agitation, nervousness  · confusion  · hallucinations  · skin rash, itching  Side effects that usually do not require medical attention (report to your doctor or health care professional if they continue or are bothersome):  · constipation  · diarrhea  · dizziness  · headache  What may interact with this medicine?  · delavirdine  · itraconazole  · ketoconazole  What if I miss a dose?  If you miss a dose, take it as soon as you can. If it is almost time for your next dose, take only that dose. Do not take double or extra doses.  Where should I keep my medicine?  Keep out of the reach of children.  Store at room temperature between 15 and 30 degrees C (59 and 86 degrees F). Do not freeze. Throw away any unused medicine after the expiration date.  What should I tell my health care provider before I take this medicine?  They need to know if you have any of these conditions:  · kidney or liver disease  · trouble swallowing  · an unusual or allergic reaction to famotidine, other medicines, foods, dyes, or preservatives  · pregnant or trying to get pregnant  · breast-feeding  What should I watch for while using this medicine?  Tell your doctor or health care professional if your condition does not start to get better or if it gets worse. Finish the full course of tablets prescribed, even if you feel better.  Do not take with aspirin, ibuprofen or other antiinflammatory medicines. These can make your condition worse.  Do not smoke cigarettes or drink alcohol. These cause irritation in your stomach and can increase the time it will  take for ulcers to heal.  If you get black, tarry stools or vomit up what looks like coffee grounds, call your doctor or health care professional at once. You may have a bleeding ulcer.  NOTE:This sheet is a summary. It may not cover all possible information. If you have questions about this medicine, talk to your doctor, pharmacist, or health care provider. Copyright© 2017 [employer]        Montelukast oral tablets  What is this medicine?  MONTELUKAST (mon te LOO kast) is used to prevent and treat the symptoms of asthma. It is also used to treat allergies. Do not use for an acute asthma attack.  How should I use this medicine?  This medicine should be given by mouth. Follow the directions on the prescription label. Take this medicine at the same time every day. You may take this medicine with or without meals. Do not chew the tablets. Do not stop taking your medicine unless your doctor tells you to.  Talk to your pediatrician regarding the use of this medicine in children. Special care may be needed. While this drug may be prescribed for children as young as 15 years of age for selected conditions, precautions do apply.  What side effects may I notice from receiving this medicine?  Side effects that you should report to your doctor or health care professional as soon as possible:  · allergic reactions like skin rash or hives, or swelling of the face, lips, or tongue  · breathing problems  · confusion  · dark urine  · fever or infection  · flu-like symptoms  · hallucinations  · painful lumps under the skin  · pain, tingling, numbness in the hands or feet  · sinus pain or swelling  · suicidal thoughts or other mood changes  · trouble sleeping  · unusual bleeding or bruising  · yellowing of the eyes or skin  Side effects that usually do not require medical attention (report to your doctor or health care professional if they continue or are  bothersome):  · cough  · dizziness  · drowsiness  · headache  · nightmares  · stomach upset  · stuffy nose  What may interact with this medicine?  · anti-infectives like rifampin and rifabutin  · medicines for diabetes like rosiglitazone and repaglinide  · medicines for seizures like phenytoin, phenobarbital, and carbamazepine  · paclitaxel  What if I miss a dose?  If you miss a dose, take it as soon as you can. If it is almost time for your next dose, take only that dose. Do not take double or extra doses.  Where should I keep my medicine?  Keep out of the reach of children.  Store at room temperature between 15 and 30 degrees C (59 and 86 degrees F). Protect from light and moisture. Keep this medicine in the original bottle. Throw away any unused medicine after the expiration date.  What should I tell my health care provider before I take this medicine?  They need to know if you have any of these conditions:  · liver disease  · an unusual or allergic reaction to montelukast, other medicines, foods, dyes, or preservatives  · pregnant or trying to get pregnant  · breast-feeding  What should I watch for while using this medicine?  Visit your doctor or health care professional for regular checks on your progress. Tell your doctor or health care professional if your allergy or asthma symptoms do not improve. Take your medicine even when you do not have symptoms. Do not stop taking any of your medicine(s) unless your doctor tells you to.  If you have asthma, talk to your doctor about what to do in an acute asthma attack. Always have your inhaled rescue medicine for asthma attacks with you.  Patients and their families should watch for new or worsening thoughts of suicide or depression. Also watch for sudden changes in feelings such as feeling anxious, agitated, panicky, irritable, hostile, aggressive, impulsive, severely restless, overly excited and hyperactive, or not being able to sleep. Any worsening of mood or  thoughts of suicide or dying should be reported to your health care professional right away.  NOTE:This sheet is a summary. It may not cover all possible information. If you have questions about this medicine, talk to your doctor, pharmacist, or health care provider. Copyright© 2017 [employer]        Budesonide; Formoterol Inhalation  What is this medicine?  BUDESONIDE; FORMOTEROL (byoo PAPA oh nide; for MOH te derrick) inhalation is a combination of two medicines that decrease inflammation and help to open up the airways in your lungs. It is used to treat asthma. Do NOT use for an acute asthma attack.  How should I use this medicine?  This medicine is inhaled through the mouth. Rinse your mouth with water after use. Make sure not to swallow the water. Follow the directions on your prescription label. Do not use more often than directed. Do not stop taking except on your doctor's advice. Make sure that you are using your inhaler correctly. Ask your doctor or health care provider if you have any questions.  A special MedGuide will be given to you by the pharmacist with each prescription and refill. Be sure to read this information carefully each time.  Talk to your pediatrician regarding the use of this medicine in children. While this drug may be prescribed for children as young as 12 years of age for selected conditions, precautions do apply.  What side effects may I notice from receiving this medicine?  Side effects that you should report to your doctor or health care professional as soon as possible:  · allergic reactions such as skin rash or itching, hives, swelling of the face, lips or tongue  · breathing problems  · changes in vision  · chest pain  · fast, irregular heartbeat  · feeling faint or lightheaded, falls  · fever  · high blood pressure  · nervousness  · tremors  · white patches or sores in mouth  Side effects that usually do not require medical attention (report to your doctor or health care  professional if they continue or are bothersome):  · cough  · different taste in mouth  · headache  · sore throat  · stuffy nose  · stomach upset  What may interact with this medicine?  Do not take this medicine with any of the following medications:  · MAOIs like Carbex, Eldepryl, Marplan, Nardil, and Parnate  · mifepristone  · probucol  · procarbazine  · some other medicines for asthma like formoterol, salmeterol  This medicine may also interact with the following medications:  · antibiotics like clarithromycin, erythromycin  · cimetidine  · diuretics  · grapefruit juice  · itraconazole  · ketoconazole  · medicines for depression, anxiety, or psychotic disturbances  · medicines for irregular heartbeat  · methadone  · some heart medicines like atenolol, metoprolol  · some other medicines for breathing problems  · some vaccines  What if I miss a dose?  If you miss a dose, use it as soon as you remember. If it is almost time for your next dose, use only that dose and continue with your regular schedule, spacing doses evenly. Do not use double or extra doses.  Where should I keep my medicine?  Keep out of the reach of children.  Store in a dry place at room temperature between 20 and 25 degrees C (68 and 77 degrees F). Do not get the inhaler wet. Keep track of the number of doses used. Throw away the inhaler after using the marked number of inhalations or after the expiration date, whichever comes first. Do not burn or puncture canister.  What should I tell my health care provider before I take this medicine?  They need to know if you have any of these conditions:  · bone problems  · diabetes  · heart disease or irregular heartbeat  · high blood pressure  · immune system problems  · infection  · liver disease  · worsening asthma  · an unusual or allergic reaction to budesonide, formoterol, medicines, foods, dyes, or preservatives  · pregnant or trying to get pregnant  · breast-feeding  What should I watch for while  using this medicine?  Tell your doctor or health care professional if your symptoms do not improve or get worse. If you need to use your short-acting inhalers more often, call your doctor right away. Do not use more than every 12 hours.  If you have asthma, be aware that using this medicine may increase your risk of dying from asthma related problems. Talk to your doctor about the risks and benefits of taking this medicine. NEVER use this medicine for an acute asthma attack.  This medicine may increase your risk of getting an infection. Tell your doctor or health care professional if you are around anyone with measles or chickenpox, or if you develop sores or blisters that do not heal properly.  NOTE:This sheet is a summary. It may not cover all possible information. If you have questions about this medicine, talk to your doctor, pharmacist, or health care provider. Copyright© 2017 [employer]

## 2017-12-08 PROBLEM — T59.91XA INHALATION OF NOXIOUS FUMES: Status: ACTIVE | Noted: 2017-12-08

## 2018-01-10 ENCOUNTER — TELEPHONE (OUTPATIENT)
Dept: PULMONOLOGY | Facility: CLINIC | Age: 48
End: 2018-01-10

## 2018-01-10 NOTE — LETTER
January 12, 2018    Lamin Manjarrez  [address]  [facility] [address]       [facility]  [address] [place] [id number]  Phone: [phone]  Fax: [phone] Dear Lino Josseline:    Enclosed is a letter I have sent to your worker's comp insurance company    If you have any questions or concerns, please don't hesitate to call.    Sincerely,        Matias Lugo MD

## 2018-01-10 NOTE — TELEPHONE ENCOUNTER
----- Message from Donna Monroy sent at 1/10/2018  2:55 PM CST -----  Patient calling to speak to the nurse regarding a letter he needs. Please adv/call [phone].//thanks. cw

## 2018-01-12 ENCOUNTER — TELEPHONE (OUTPATIENT)
Dept: PULMONOLOGY | Facility: CLINIC | Age: 48
End: 2018-01-12

## 2018-01-12 NOTE — TELEPHONE ENCOUNTER
----- Message from Mabel Figueroa sent at 1/12/2018  1:25 PM CST -----  Contact: prt   Pt calling to discuss if office sent letter for him,,,,Please call pt back at [phone]

## 2018-01-23 RX ORDER — PROMETHAZINE HYDROCHLORIDE AND DEXTROMETHORPHAN HYDROBROMIDE 6.25; 15 MG/5ML; MG/5ML
SYRUP ORAL
Qty: 240 ML | Refills: 4 | Status: ON HOLD | OUTPATIENT
Start: 2018-01-23 | End: 2018-04-12 | Stop reason: CLARIF

## 2018-03-07 ENCOUNTER — TELEPHONE (OUTPATIENT)
Dept: PULMONOLOGY | Facility: CLINIC | Age: 48
End: 2018-03-07

## 2018-03-07 NOTE — TELEPHONE ENCOUNTER
----- Message from Edna Figueroa sent at 3/7/2018 10:29 AM CST -----  Contact: patient   Patient had family emergency and was unable to make appts today. Please contact to reschedule visit and testing.       Thanks

## 2018-03-13 ENCOUNTER — TELEPHONE (OUTPATIENT)
Dept: GASTROENTEROLOGY | Facility: CLINIC | Age: 48
End: 2018-03-13

## 2018-03-13 ENCOUNTER — INITIAL CONSULT (OUTPATIENT)
Dept: GASTROENTEROLOGY | Facility: CLINIC | Age: 48
End: 2018-03-13
Payer: OTHER MISCELLANEOUS

## 2018-03-13 VITALS
WEIGHT: 315 LBS | BODY MASS INDEX: 50.62 KG/M2 | DIASTOLIC BLOOD PRESSURE: 78 MMHG | HEART RATE: 80 BPM | HEIGHT: 66 IN | SYSTOLIC BLOOD PRESSURE: 112 MMHG

## 2018-03-13 DIAGNOSIS — R10.13 EPIGASTRIC PAIN: ICD-10-CM

## 2018-03-13 DIAGNOSIS — R07.0 THROAT PAIN: ICD-10-CM

## 2018-03-13 DIAGNOSIS — E66.01 MORBID OBESITY WITH BMI OF 50.0-59.9, ADULT: ICD-10-CM

## 2018-03-13 DIAGNOSIS — K21.9 GASTROESOPHAGEAL REFLUX DISEASE, ESOPHAGITIS PRESENCE NOT SPECIFIED: Primary | ICD-10-CM

## 2018-03-13 PROCEDURE — 99999 PR PBB SHADOW E&M-EST. PATIENT-LVL III: CPT | Mod: PBBFAC,,, | Performed by: NURSE PRACTITIONER

## 2018-03-13 PROCEDURE — 99204 OFFICE O/P NEW MOD 45 MIN: CPT | Mod: S$GLB,,, | Performed by: NURSE PRACTITIONER

## 2018-03-13 NOTE — PATIENT INSTRUCTIONS
Continue omeprazole and pepcid.       Tips to Control Acid Reflux    To control acid reflux, youll need to make some basic diet and lifestyle changes. The simple steps outlined below may be all youll need to ease discomfort.  Watch what you eat  · Avoid fatty foods and spicy foods.  · Eat fewer acidic foods, such as citrus and tomato-based foods. These can increase symptoms.  · Limit drinking alcohol, caffeine, and fizzy beverages. All increase acid reflux.  · Try limiting chocolate, peppermint, and spearmint. These can worsen acid reflux in some people.  Watch when you eat  · Avoid lying down for 3 hours after eating.  · Do not snack before going to bed.  Raise your head  Raising your head and upper body by 4 to 6 inches helps limit reflux when youre lying down. Put blocks under the head of your bed frame to raise it.  Other changes  · Lose weight, if you need to  · Dont exercise near bedtime  · Avoid tight-fitting clothes  · Limit aspirin and ibuprofen  · Stop smoking   Date Last Reviewed: 7/1/2016  © 1282-7055 [employer] [place]. [address] [place] [address], [place] [address]. All rights reserved. This information is not intended as a substitute for professional medical care. Always follow your healthcare professional's instructions.

## 2018-03-13 NOTE — TELEPHONE ENCOUNTER
Clearance for surgery:    The patient is cleared for anesthesia and surgery from a pulmonary standpoint.    Subjective:       Patient ID: Lamin Manjarrez is a 48 y.o. male.    Chief Complaint: He       No chief complaint on file.    HPI     This is a followup examination for this patient with the history of fumes/dust - HCL  Exposure. 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  He has not returned to work since his exposure   He refused to perform Pulmonary Function Studies today due to acute symptoms.      Asthma Follow-up  The patient has previously been evaluated here for asthma and presents for an asthma follow-up. The patient is currently having symptoms / an exacerbation. Current symptoms include dyspnea, non-productive cough and wheezing. Symptoms have been present since several weeks ago and have been gradually worsening. He denies fever or chills. Associated symptoms include fatigue, poor exercise tolerance, shortness of breath, weakness, wheezing and GERD .  This episode appears to have been triggered by cold air and smoke. Treatments tried for the current exacerbation include short-acting inhaled beta-adrenergic agonists, which have provided some relief of symptoms. The patient has been having similar episodes for approximately 18 months months.      Current Disease Severity  The patient is having daytime symptoms throughout the day. The patient is having daytime symptoms often 7 times per week. The patient is using short-acting beta agonists for symptom control several times per day. He has exacerbations requiring oral systemic corticosteroids 0 times per year. Current limitations in activity from asthma: unable to be exposed to fumes, dust. Number of days of school or work missed in the last month: not applicable. Number of  urgent/emergent visit in last year: 30-40 times - visits to PCP and acute care clinic.  The patient is not using a spacer with MDIs. His best peak flow rate is na. He is not monitoring peak flow rates at home.    CLINIC NOTE    HISTORY OF PRESENT ILLNESS:  This 47-year-old gentleman is seen back in followup   examination in the office.  During his last office visit, he had normal   pulmonary function studies and normal examination.  Since his last visit, he   reports that the symptoms have become worse.  He has had multiple visits to the   doctor's offices, acute care clinics, and emergency rooms.  He has come   relatively far distance because he feels he needs to have better control of his   pulmonary symptoms.  There is a significant amount of social stress at his   present situation and his wife left him, but has plans to move back and he has   been extremely anxious about his personal situation.  He has not returned to   work since his accident in 2016.    Review of the information is obtained from the patient today, he informed that   he is feeling poorly and is unable to perform pulmonary function studies.  All   the information obtained is really of subjective type based on the patient's own   complaints and symptoms.  His physical examination today is unremarkable and I   was unable to have him perform pulmonary function studies.    His prescriptions were renewed and I suspect that there is a significant amount   of secondary gain involved in his presentation.    PLAN:  The patient is very vocal about the severity of his disease, but really   has very little objective findings.    Refills were given for prescriptions.  We will plan on seeing him back in the   New Year.      MALCOLM  dd: 12/08/2017 13:05:53 (CST)  td: 12/09/2017 02:57:19 (CST)  Doc ID   #[id number]  Job ID #[id number]    CC:     [id number]                Past Medical History:   Diagnosis Date    GERD (gastroesophageal reflux disease)      No past  surgical history on file.  Social History     Social History    Marital status:      Spouse name: N/A    Number of children: N/A    Years of education: N/A     Occupational History    Not on file.     Social History Main Topics    Smoking status: Never Smoker    Smokeless tobacco: Never Used    Alcohol use Not on file    Drug use: Unknown    Sexual activity: Not on file     Other Topics Concern    Not on file     Social History Narrative    No narrative on file     Review of Systems   HENT: Positive for postnasal drip and rhinorrhea.    Respiratory: Positive for cough, choking, chest tightness, shortness of breath, wheezing, use of rescue inhaler and Paroxysmal Nocturnal Dyspnea.    Gastrointestinal: Positive for acid reflux.   Psychiatric/Behavioral: The patient is nervous/anxious.        Objective:      Physical Exam   Constitutional: He is oriented to person, place, and time. He appears well-developed and well-nourished.   HENT:   Head: Normocephalic and atraumatic.   Mouth/Throat: Oropharynx is clear and moist.   Eyes: Conjunctivae are normal. Pupils are equal, round, and reactive to light.   Neck: Neck supple. No JVD present. No tracheal deviation present. No thyromegaly present.   Cardiovascular: Normal rate, regular rhythm and normal heart sounds.    Pulmonary/Chest: Effort normal and breath sounds normal.   Abdominal: Soft.   Musculoskeletal: Normal range of motion.   Lymphadenopathy:     He has no cervical adenopathy.   Neurological: He is alert and oriented to person, place, and time.   Skin: Skin is warm and dry.   Psychiatric:   Patient was very insistent and demonstrative during his examination. Appeared anxious and distressed over his social situation and health.   Nursing note and vitals reviewed.    Personal Diagnostic Review  No recent Chest X Ray , No recent Pulmonary Function Studies    Radiology Result      Name:   :  Patient MRN:   Lamin Manjarrez 1970 [id number]    Account Number: Room & Bed Accession Number:   [id number]   [id number]   Authorizing Physician: Patient Class: Diagnosis:   Matias Lugo OP- Outpatient Diagnostic Testing SOB (shortness of breath) [R06.02 (ICD-10-CM)]   Procedure:  Exam Date: Reason for Exam:   X-Ray Chest PA And Lateral 01/18/2017 SOB          RESULTS:  Findings: There is shallow inspiratory effort on both the frontal and lateral radiographs.  Allowing for this, heart size is normal and the lungs are clear bilaterally.  There are no pleural effusions.2 views  IMPRESSION:    Expiratory chest radiograph. No active disease.        Electronically signed by: ANA MCDONALD MD  Date:                                            01/18/17  Time:                                           11:02           Signed By:  Ana Mcdonald MD on 1/18/2017 11:02 AM           No flowsheet data found.      Assessment:       No diagnosis found.    Outpatient Encounter Prescriptions as of 3/13/2018   Medication Sig Dispense Refill    albuterol (VENTOLIN HFA) 90 mcg/actuation inhaler Inhale 2 puffs into the lungs every 4 (four) hours as needed for Wheezing. Rescue 3 Inhaler 4    albuterol-ipratropium 2.5mg-0.5mg/3mL (DUO-NEB) 0.5 mg-3 mg(2.5 mg base)/3 mL nebulizer solution Take 3 mLs by nebulization every 6 (six) hours. Worker's comp.[id number] 1080 mL 3    azithromycin (ZITHROMAX Z-GARCIA) 250 MG tablet Take 1 tablet (250 mg total) by mouth once daily. 500 mg on day 1 (two tablets) followed by 250 mg once daily on days 2-5.Worker's Comp 6 tablet 1    benzonatate (TESSALON) 100 MG capsule Take 1 capsule (100 mg total) by mouth 3 (three) times daily as needed for Cough. Worker's comp.[id number] 90 capsule 2    budesonide-formoterol 160-4.5 mcg (SYMBICORT) 160-4.5 mcg/actuation HFAA Inhale 2 puffs into the lungs every 12 (twelve) hours. Wash out mouth after using. Worker's Comp 3 Inhaler 4    cromolyn (INTAL) 20 mg/2 mL Nebu Take 2 mLs (20 mg total) by  nebulization 3 (three) times daily. Worker's Comp 100 mL 11    famotidine (PEPCID) 40 MG tablet Take 1 tablet (40 mg total) by mouth once daily. Worker's Comp 30 tablet 11    inhalation device (BREATHERITE VALVED MDI CHAMBER) Use as directed for inhalation. Worker's comp 1 Device prn    inhalation spacing device (BREATHERITE VALVED MDI CHAMBER) Use as directed for inhalation. Worker's Comp 1 Device prn    montelukast (SINGULAIR) 10 mg tablet Take 1 tablet (10 mg total) by mouth every evening. Worker's comp.[id number] 90 tablet 3    predniSONE (DELTASONE) 20 MG tablet Prednisone 60 mg/ day for 3 days, 40 mg/day for 3 days,20 mg/ day for 3 days, (1/2 tablet )10 mg a day for 3 days.Worker's Comp 20 tablet 1    promethazine-dextromethorphan (PROMETHAZINE-DM) 6.25-15 mg/5 mL Syrp Take 5 mLs by mouth every 6 (six) hours as needed. Worker's comp 473 mL 5    promethazine-dextromethorphan (PROMETHAZINE-DM) 6.25-15 mg/5 mL Syrp Take 5 mLs by mouth every 6 (six) hours as needed. Worker's Comp[ 473 mL 5    promethazine-dextromethorphan (PROMETHAZINE-DM) 6.25-15 mg/5 mL Syrp TAKE FIVE ML (CC) BY MOUTH EVERY 6 HOURS AS NEEDED 240 mL 4    sertraline (ZOLOFT) 50 MG tablet Take 1 tablet (50 mg total) by mouth once daily. Worker's Comp 30 tablet 11    [DISCONTINUED] acetaminophen (TYLENOL) 500 MG tablet Take 500 mg by mouth every 6 (six) hours as needed for Pain.      [DISCONTINUED] trazodone (DESYREL) 100 MG tablet Take 1 tablet (100 mg total) by mouth every evening. 30 tablet 11     No facility-administered encounter medications on file as of 3/13/2018.      No orders of the defined types were placed in this encounter.    Plan:       Requested Prescriptions      No prescriptions requested or ordered in this encounter     There are no diagnoses linked to this encounter.       No Follow-up on file.    MEDICAL DECISION MAKING: Moderate to high complexity.  Overall, the multiple problems listed are of moderate to high  severity that may impact quality of life and activities of daily living. Side effects of medications, treatment plan as well as options and alternatives reviewed and discussed with patient. There was counseling of patient concerning these issues.    Total time spent in face to face counseling and coordination of care - 45  minutes over 50% of time was used in discussion of prognosis, risks, benefits of treatment, instructions and compliance with regimen . Discussion with other physicians or health care providers (DME, NP, pharmacy, respiratory therapy) occurred.

## 2018-03-14 ENCOUNTER — TELEPHONE (OUTPATIENT)
Dept: GASTROENTEROLOGY | Facility: CLINIC | Age: 48
End: 2018-03-14

## 2018-03-14 NOTE — PROGRESS NOTES
Clinic Consult:  [facility] Consultation Note    Reason for Consult:  The primary encounter diagnosis was Gastroesophageal reflux disease, esophagitis presence not specified. Diagnoses of Throat pain, Epigastric pain, and Morbid obesity with BMI of 50.0-59.9, adult were also pertinent to this visit.    PCP: Primary Doctor No       HPI:  This is a 48 y.o. male here for evaluation of the above. He was referred to me by Dr. Lugo. He originally saw Dr. Lugo for a second opinion after being involved in a work accident. On 4/14/16, he had a toxic fume inhalation leaving him with respiratory issues. Today, he presents with different symptoms. He reports that a few months ago, he started with epigastric pain that radiated to his chest, throat, and right arm. These symptoms are intermittent. He originally thought he was having cardiac symptoms and states he was seen in the ER for this. Cardiac workup was unrevealing. He now feels this is related to reflux. He is on omeprazole without any relief. Risk factors for reflux include obesity. He reports that he has been heavier than he is now and has never had this problem. He was taking diclofenac previously but is not anymore. No nausea or vomiting. No hematochezia or melena.     Review of Systems   Constitutional: Negative for fever, malaise/fatigue and weight loss.   HENT: Negative for sore throat.    Respiratory: Negative for cough and wheezing.    Cardiovascular: Negative for chest pain and palpitations.   Gastrointestinal: Positive for abdominal pain and heartburn. Negative for blood in stool, constipation, diarrhea, melena, nausea and vomiting.   Genitourinary: Negative for dysuria and frequency.   Musculoskeletal: Negative for back pain, joint pain, myalgias and neck pain.   Skin: Negative for itching and rash.   Neurological: Negative for dizziness, speech change, seizures, loss of consciousness and headaches.   Psychiatric/Behavioral: Negative for  depression and substance abuse. The patient is not nervous/anxious.        Medical History:  has a past medical history of GERD (gastroesophageal reflux disease).    Surgical History:  has no past surgical history on file.    Family History: family history is not on file..     Social History:  reports that he has never smoked. He has never used smokeless tobacco.    Allergies: Reviewed    Home Medications:   Current Outpatient Prescriptions on File Prior to Visit   Medication Sig Dispense Refill    albuterol (VENTOLIN HFA) 90 mcg/actuation inhaler Inhale 2 puffs into the lungs every 4 (four) hours as needed for Wheezing. Rescue 3 Inhaler 4    albuterol-ipratropium 2.5mg-0.5mg/3mL (DUO-NEB) 0.5 mg-3 mg(2.5 mg base)/3 mL nebulizer solution Take 3 mLs by nebulization every 6 (six) hours. Worker's comp.[id number] 1080 mL 3    azithromycin (ZITHROMAX Z-GARCIA) 250 MG tablet Take 1 tablet (250 mg total) by mouth once daily. 500 mg on day 1 (two tablets) followed by 250 mg once daily on days 2-5.Worker's Comp 6 tablet 1    benzonatate (TESSALON) 100 MG capsule Take 1 capsule (100 mg total) by mouth 3 (three) times daily as needed for Cough. Worker's comp.[id number] 90 capsule 2    budesonide-formoterol 160-4.5 mcg (SYMBICORT) 160-4.5 mcg/actuation HFAA Inhale 2 puffs into the lungs every 12 (twelve) hours. Wash out mouth after using. Worker's Comp 3 Inhaler 4    cromolyn (INTAL) 20 mg/2 mL Nebu Take 2 mLs (20 mg total) by nebulization 3 (three) times daily. Worker's Comp 100 mL 11    famotidine (PEPCID) 40 MG tablet Take 1 tablet (40 mg total) by mouth once daily. Worker's Comp 30 tablet 11    inhalation device (BREATHERITE VALVED MDI CHAMBER) Use as directed for inhalation. Worker's comp 1 Device prn    inhalation spacing device (BREATHERITE VALVED MDI CHAMBER) Use as directed for inhalation. Worker's Comp 1 Device prn    montelukast (SINGULAIR) 10 mg tablet Take 1 tablet (10 mg total) by mouth every  "evening. Worker's comp.[id number] 90 tablet 3    predniSONE (DELTASONE) 20 MG tablet Prednisone 60 mg/ day for 3 days, 40 mg/day for 3 days,20 mg/ day for 3 days, (1/2 tablet )10 mg a day for 3 days.Worker's Comp 20 tablet 1    promethazine-dextromethorphan (PROMETHAZINE-DM) 6.25-15 mg/5 mL Syrp Take 5 mLs by mouth every 6 (six) hours as needed. Worker's comp 473 mL 5    promethazine-dextromethorphan (PROMETHAZINE-DM) 6.25-15 mg/5 mL Syrp Take 5 mLs by mouth every 6 (six) hours as needed. Worker's Comp[ 473 mL 5    promethazine-dextromethorphan (PROMETHAZINE-DM) 6.25-15 mg/5 mL Syrp TAKE FIVE ML (CC) BY MOUTH EVERY 6 HOURS AS NEEDED 240 mL 4    sertraline (ZOLOFT) 50 MG tablet Take 1 tablet (50 mg total) by mouth once daily. Worker's Comp 30 tablet 11     No current facility-administered medications on file prior to visit.        Physical Exam:  /78   Pulse 80   Ht 5' 6" (1.676 m)   Wt (!) 151.7 kg (334 lb 7 oz)   BMI 53.98 kg/m²   Body mass index is 53.98 kg/m².  Physical Exam   Constitutional: He is oriented to person, place, and time and well-developed, well-nourished, and in no distress. No distress.   HENT:   Head: Normocephalic.   Eyes: Conjunctivae are normal. Pupils are equal, round, and reactive to light.   Cardiovascular: Normal rate, regular rhythm and normal heart sounds.    Pulmonary/Chest: Effort normal and breath sounds normal. No respiratory distress.   Abdominal: Soft. Bowel sounds are normal. He exhibits no distension. There is no tenderness.   Neurological: He is alert and oriented to person, place, and time. No cranial nerve deficit.   Skin: Skin is warm and dry. No rash noted.   Psychiatric: Mood and affect normal.       Labs: Pertinent labs reviewed.    Assessment:  1. Gastroesophageal reflux disease, esophagitis presence not specified    2. Throat pain    3. Epigastric pain    4. Morbid obesity with BMI of 50.0-59.9, adult         Recommendations:  Gastroesophageal reflux " disease, esophagitis presence not specified  Throat pain  Epigastric pain  - patient is here under workman's compensation.   - unsure if symptoms are connected to previous accident in 2016 as symptoms recently started a few months ago. This was discussed with patient who then states his symptoms have been present for about a year but have recently worsened.  - we will get an EGD for further evaluation.   - discussed with patient that he has other risk factors for GERD that include morbid obesity. Discussed weight loss through diet and mild exercise (given his underlying respiratory condition)  - patient was very polite but expressed wishes to be seen by a physician. Will set up a 6 week follow up with physician provider.  -     Case request GI: ESOPHAGOGASTRODUODENOSCOPY (EGD)  -     CBC auto differential; Future; Expected date: 03/13/2018  -     Comprehensive metabolic panel; Future; Expected date: 03/13/2018  -     Lipase; Future; Expected date: 03/13/2018    Morbid obesity with BMI of 50.0-59.9, adult  - likely contributing factor to reflux  - plan as above.     Follow-up in about 6 weeks (around 4/24/2018).    Thank you so much for allowing me to participate in the care of JAKE Mclaughlin

## 2018-03-29 ENCOUNTER — TELEPHONE (OUTPATIENT)
Dept: GASTROENTEROLOGY | Facility: CLINIC | Age: 48
End: 2018-03-29

## 2018-03-29 NOTE — TELEPHONE ENCOUNTER
----- Message from Willow Estes sent at 3/29/2018  8:18 AM CDT -----  Regarding: Work Comp Approved EGD for Patient  Mr. Manjarrez has been approved by Work Comp for the EGD.  Please let Edna Henry know when it is scheduled so she can add the work comp information to the hospital account her fax is [phone]  Her phone is [phone]    Thank you

## 2018-03-29 NOTE — TELEPHONE ENCOUNTER
Informed Willow in [facility] office [id number] that workman's comp has been approved and EGD set for 4/12/18. She verbalized understanding.

## 2018-04-12 ENCOUNTER — HOSPITAL ENCOUNTER (OUTPATIENT)
Facility: HOSPITAL | Age: 48
Discharge: HOME | End: 2018-04-12
Attending: INTERNAL MEDICINE | Admitting: INTERNAL MEDICINE
Payer: OTHER MISCELLANEOUS

## 2018-04-12 ENCOUNTER — ANESTHESIA EVENT (OUTPATIENT)
Dept: ENDOSCOPY | Facility: HOSPITAL | Age: 48
End: 2018-04-12
Payer: OTHER MISCELLANEOUS

## 2018-04-12 ENCOUNTER — ANESTHESIA (OUTPATIENT)
Dept: ENDOSCOPY | Facility: HOSPITAL | Age: 48
End: 2018-04-12
Payer: OTHER MISCELLANEOUS

## 2018-04-12 ENCOUNTER — SURGERY (OUTPATIENT)
Age: 48
End: 2018-04-12

## 2018-04-12 ENCOUNTER — TELEPHONE (OUTPATIENT)
Dept: PULMONOLOGY | Facility: CLINIC | Age: 48
End: 2018-04-12

## 2018-04-12 VITALS
DIASTOLIC BLOOD PRESSURE: 89 MMHG | WEIGHT: 315 LBS | OXYGEN SATURATION: 100 % | HEART RATE: 88 BPM | TEMPERATURE: 98 F | HEIGHT: 66 IN | SYSTOLIC BLOOD PRESSURE: 130 MMHG | RESPIRATION RATE: 20 BRPM | BODY MASS INDEX: 50.62 KG/M2

## 2018-04-12 DIAGNOSIS — K29.30 SUPERFICIAL GASTRITIS WITHOUT HEMORRHAGE, UNSPECIFIED CHRONICITY: Primary | ICD-10-CM

## 2018-04-12 DIAGNOSIS — K21.9 GERD (GASTROESOPHAGEAL REFLUX DISEASE): ICD-10-CM

## 2018-04-12 PROCEDURE — 88305 TISSUE EXAM BY PATHOLOGIST: CPT | Mod: 26,,, | Performed by: PATHOLOGY

## 2018-04-12 PROCEDURE — 88342 IMHCHEM/IMCYTCHM 1ST ANTB: CPT | Mod: 26,,, | Performed by: PATHOLOGY

## 2018-04-12 PROCEDURE — 25000003 PHARM REV CODE 250: Performed by: INTERNAL MEDICINE

## 2018-04-12 PROCEDURE — 88342 IMHCHEM/IMCYTCHM 1ST ANTB: CPT | Performed by: PATHOLOGY

## 2018-04-12 PROCEDURE — 43239 EGD BIOPSY SINGLE/MULTIPLE: CPT | Mod: ,,, | Performed by: INTERNAL MEDICINE

## 2018-04-12 PROCEDURE — 27201012 HC FORCEPS, HOT/COLD, DISP: Performed by: INTERNAL MEDICINE

## 2018-04-12 PROCEDURE — 88305 TISSUE EXAM BY PATHOLOGIST: CPT | Performed by: PATHOLOGY

## 2018-04-12 PROCEDURE — 25000003 PHARM REV CODE 250: Performed by: NURSE ANESTHETIST, CERTIFIED REGISTERED

## 2018-04-12 PROCEDURE — 37000008 HC ANESTHESIA 1ST 15 MINUTES: Performed by: INTERNAL MEDICINE

## 2018-04-12 PROCEDURE — 43239 EGD BIOPSY SINGLE/MULTIPLE: CPT | Performed by: INTERNAL MEDICINE

## 2018-04-12 PROCEDURE — 37000009 HC ANESTHESIA EA ADD 15 MINS: Performed by: INTERNAL MEDICINE

## 2018-04-12 PROCEDURE — 63600175 PHARM REV CODE 636 W HCPCS: Performed by: NURSE ANESTHETIST, CERTIFIED REGISTERED

## 2018-04-12 RX ORDER — GLYCOPYRROLATE 0.2 MG/ML
INJECTION INTRAMUSCULAR; INTRAVENOUS
Status: DISCONTINUED | OUTPATIENT
Start: 2018-04-12 | End: 2018-04-12

## 2018-04-12 RX ORDER — SODIUM CHLORIDE, SODIUM LACTATE, POTASSIUM CHLORIDE, CALCIUM CHLORIDE 600; 310; 30; 20 MG/100ML; MG/100ML; MG/100ML; MG/100ML
INJECTION, SOLUTION INTRAVENOUS CONTINUOUS
Status: DISCONTINUED | OUTPATIENT
Start: 2018-04-12 | End: 2018-04-12 | Stop reason: HOSPADM

## 2018-04-12 RX ORDER — LIDOCAINE HYDROCHLORIDE 10 MG/ML
INJECTION INFILTRATION; PERINEURAL
Status: DISCONTINUED | OUTPATIENT
Start: 2018-04-12 | End: 2018-04-12

## 2018-04-12 RX ORDER — PROPOFOL 10 MG/ML
VIAL (ML) INTRAVENOUS
Status: DISCONTINUED | OUTPATIENT
Start: 2018-04-12 | End: 2018-04-12

## 2018-04-12 RX ADMIN — GLYCOPYRROLATE 0.2 MG: 0.2 INJECTION INTRAMUSCULAR; INTRAVENOUS at 10:04

## 2018-04-12 RX ADMIN — LIDOCAINE HYDROCHLORIDE 50 MG: 10 INJECTION, SOLUTION INFILTRATION; PERINEURAL at 10:04

## 2018-04-12 RX ADMIN — SODIUM CHLORIDE, SODIUM LACTATE, POTASSIUM CHLORIDE, AND CALCIUM CHLORIDE: 600; 310; 30; 20 INJECTION, SOLUTION INTRAVENOUS at 10:04

## 2018-04-12 RX ADMIN — PROPOFOL 200 MG: 10 INJECTION, EMULSION INTRAVENOUS at 10:04

## 2018-04-12 RX ADMIN — PROPOFOL 100 MG: 10 INJECTION, EMULSION INTRAVENOUS at 10:04

## 2018-04-12 NOTE — PROVATION PATIENT INSTRUCTIONS
Discharge Summary/Instructions after an Endoscopic Procedure  Patient Name: Lamin Manjarrez  Patient MRN: [id number]  Patient YOB: 1970 Thursday, April 12, 2018 Mike Littlejohn III, MD  RESTRICTIONS:  During your procedure today, you received medications for sedation.  These   medications may affect your judgment, balance and coordination.  Therefore,   for 24 hours, you have the following restrictions:   - DO NOT drive a car, operate machinery, make legal/financial decisions,   sign important papers or drink alcohol.    ACTIVITY:  The following day: return to full activity including work, except no heavy   lifting, straining or running for 3 days if polyps were removed.  DIET:  Eat and drink normally unless instructed otherwise.     TREATMENT FOR COMMON SIDE EFFECTS:  - Mild abdominal pain, nausea, belching, bloating or excessive gas:  rest,   eat lightly and use a heating pad.  - Sore Throat: treat with throat lozenges and/or gargle with warm salt   water.  - Because air was used during the procedure, expelling large amounts of air   from your rectum or belching is normal.  - If a bowel prep was taken, you may not have a bowel movement for 1-3 days.    This is normal.  SYMPTOMS TO WATCH FOR AND REPORT TO YOUR PHYSICIAN:  1. Abdominal pain or bloating, other than gas cramps.  2. Chest pain.  3. Back pain.  4. Signs of infection such as: chills or fever occurring within 24 hours   after the procedure.  5. Rectal bleeding, which would show as bright red, maroon, or black stools.   (A tablespoon of blood from the rectum is not serious, especially if   hemorrhoids are present.)  6. Vomiting.  7. Weakness or dizziness.  GO DIRECTLY TO THE NEAREST EMERGENCY ROOM IF YOU HAVE ANY OF THE FOLLOWING:      Difficulty breathing              Chills and/or fever over 101 F   Persistent vomiting and/or vomiting blood   Severe abdominal pain   Severe chest pain   Black, tarry stools   Bleeding- more than one  tablespoon   Any other symptom or condition that you feel may need urgent attention  Your doctor recommends these additional instructions:  If any biopsies were taken, your doctors clinic will contact you in 1 to 2   weeks with any results.  - Discharge patient to home (via wheelchair).   - Resume previous diet.   - Continue present medications.   - Await pathology results.   - Return to referring physician as previously scheduled.  For questions, problems or results please call your physician Mike Littlejohn III, MD at Work:  [phone]  If you have any questions about the above instructions, call the GI   department at [phone] or call the endoscopy unit at [phone]   from 7am until 3 pm.  [facility], EMERGENCY ROOM PHONE NUMBER:   [phone]  IF A COMPLICATION OR EMERGENCY SITUATION ARISES AND YOU ARE UNABLE TO REACH   YOUR PHYSICIAN - GO DIRECTLY TO THE EMERGENCY ROOM.  I have read or have had read to me these discharge instructions for my   procedure and have received a written copy.  I understand these   instructions and will follow-up with my physician if I have any questions.     __________________________________       _____________________________________  Nurse Signature                                          Patient/Designated   Responsible Party Signature  Mike Littlejohn III, MD  4/12/2018 10:50:55 AM  This report has been verified and signed electronically.

## 2018-04-12 NOTE — TRANSFER OF CARE
"Anesthesia Transfer of Care Note    Patient: Lamin Manjarrez    Procedure(s) Performed: Procedure(s) (LRB):  ESOPHAGOGASTRODUODENOSCOPY (EGD) (N/A)    Patient location: GI    Anesthesia Type: MAC    Transport from OR: Transported from OR on room air with adequate spontaneous ventilation    Post pain: adequate analgesia    Post assessment: no apparent anesthetic complications and tolerated procedure well    Post vital signs: stable    Level of consciousness: awake, alert and oriented    Nausea/Vomiting: no nausea/vomiting    Complications: none    Transfer of care protocol was followed      Last vitals:   Visit Vitals  /69 (BP Location: Left arm, Patient Position: Lying)   Pulse 68   Temp 36.4 °C (97.6 °F) (Oral)   Resp 20   Ht 5' 6" (1.676 m)   Wt (!) 153.3 kg (338 lb)   SpO2 100%   BMI 54.55 kg/m²     "

## 2018-04-12 NOTE — ANESTHESIA POSTPROCEDURE EVALUATION
"Anesthesia Post Evaluation    Patient: Lamin Manjarrez    Procedure(s) Performed: Procedure(s) (LRB):  ESOPHAGOGASTRODUODENOSCOPY (EGD) (N/A)    Final Anesthesia Type: MAC  Patient location during evaluation: GI PACU  Patient participation: Yes- Able to Participate  Level of consciousness: awake and alert  Post-procedure vital signs: reviewed and stable  Pain management: adequate  Airway patency: patent  PONV status at discharge: No PONV  Anesthetic complications: no      Cardiovascular status: blood pressure returned to baseline  Respiratory status: unassisted, room air and spontaneous ventilation  Hydration status: euvolemic  Follow-up not needed.        Visit Vitals  /69 (BP Location: Left arm, Patient Position: Lying)   Pulse 68   Temp 36.4 °C (97.6 °F) (Oral)   Resp 20   Ht 5' 6" (1.676 m)   Wt (!) 153.3 kg (338 lb)   SpO2 100%   BMI 54.55 kg/m²       Pain/Jet Score: Pain Assessment Performed: Yes (4/12/2018 10:00 AM)  Presence of Pain: denies (4/12/2018 10:00 AM)      "

## 2018-04-12 NOTE — DISCHARGE INSTRUCTIONS
Gastritis (Adult)    Gastritis is inflammation and irritation of the stomach lining. It can be present for a short time (acute) or be long lasting (chronic). Gastritis is often caused by infection with bacteria called H pylori. More than a third of people in the [place] have this bacteria in their bodies. In many cases, H pylori causes no problems or symptoms. In some people, though, the infection irritates the stomach lining and causes gastritis. Other causes of stomach irritation include drinking alcohol or taking pain-relieving medicines called NSAIDs (such as aspirin or ibuprofen).   Symptoms of gastritis can include:  · Abdominal pain or bloating  · Loss of appetite  · Nausea or vomiting  · Vomiting blood or having black stools  · Feeling more tired than usual  An inflamed and irritated stomach lining is more likely to develop a sore called an ulcer. To help prevent this, gastritis should be treated.  Home care  If needed, medicines may be prescribed. If you have H pylori infection, treating it will likely relieve your symptoms. Other changes can help reduce stomach irritation and help it heal.  · If you have been prescribed medicines for H pylori infection, take them as directed. Take all of the medicine until it is finished or your healthcare provider tells you to stop, even if you feel better.  · Your healthcare provider may recommend avoiding NSAIDs. If you take daily aspirin for your heart or other medical reasons, do not stop without talking to your healthcare provider first.  · Avoid drinking alcohol.  · Stop smoking. Smoking can irritate the stomach and delay healing. As much as possible, stay away from second hand smoke.  Follow-up care  Follow up with your healthcare provider, or as advised by our staff. Testing may be needed to check for inflammation or an ulcer.  When to seek medical advice  Call your healthcare provider for any of the following:  · Stomach pain that gets worse or moves to the lower  right abdomen (appendix area)  · Chest pain that appears or gets worse, or spreads to the back, neck, shoulder, or arm  · Frequent vomiting (cant keep down liquids)  · Blood in the stool or vomit (red or black in color)  · Feeling weak or dizzy  · Fever of 100.4ºF (38ºC) or higher, or as directed by your healthcare provider  Date Last Reviewed: 6/22/2015  © 3460-2987 [employer]. [address] [place] [address]. All rights reserved. This information is not intended as a substitute for professional medical care. Always follow your healthcare professional's instructions.

## 2018-04-12 NOTE — INTERVAL H&P NOTE
The patient has been examined and the H&P has been reviewed:I have reviewed this note and I agree with this assessment. The patient was seen in the GI office and remains stable for endoscopy at the time of this present evaluation.          Anesthesia/Surgery risks, benefits and alternative options discussed and understood by patient/family.          Active Hospital Problems    Diagnosis  POA    GERD (gastroesophageal reflux disease) [K21.9]  Yes      Resolved Hospital Problems    Diagnosis Date Resolved POA   No resolved problems to display.

## 2018-04-12 NOTE — DISCHARGE SUMMARY
[facility]  Brief Operative Note     SUMMARY     Surgery Date: 4/12/2018     Surgeon(s) and Role:     * Mike Littlejohn III, MD - Primary    Assisting Surgeon: None    Pre-op Diagnosis:  Throat pain [R07.0]  Epigastric pain [R10.13]  Gastroesophageal reflux disease, esophagitis presence not specified [K21.9]    Post-op Diagnosis:  Post-Op Diagnosis Codes:     * Throat pain [R07.0]     * Epigastric pain [R10.13]     * Gastroesophageal reflux disease, esophagitis presence not specified [K21.9]      - Gastritis  Procedure(s) (LRB):  ESOPHAGOGASTRODUODENOSCOPY (EGD) (N/A)    Anesthesia: Monitor Anesthesia Care    Description of the findings of the procedure: Procedures completed. See Procedure note for full details.    Findings/Key Components: Procedures completed. See Procedure note for full details.    Prosthetics/Devices: None    Estimated Blood Loss: * No values recorded between 4/12/2018 12:00 AM and 4/12/2018 10:53 AM *         Specimens:   Specimen (12h ago through future)    Start     Ordered    04/12/18 1040  Specimen to Pathology - Surgery  Once     Comments:  1. Antrum biopsies- gastritis, r/o H pylori      04/12/18 1042          Discharge Note    SUMMARY     Admit Date: 4/12/2018    Discharge Date and Time: 4/12/2018    Hospital Course (synopsis of major diagnoses, care, treatment, and services provided during the course of the hospital stay):  Procedures completed. See Procedure note for full details. Discharge patient when discharge criteria met.    Final Diagnosis: Post-Op Diagnosis Codes:     * Throat pain [R07.0]     * Epigastric pain [R10.13]     * Gastroesophageal reflux disease, esophagitis presence not   specified [K21.9]      - Gastritis    Disposition: Discharge patient when discharge criteria met.    Follow Up/Patient Instructions:       Medications:  Reconciled Home Medications: Current Discharge Medication List      CONTINUE these medications which have NOT CHANGED    Details    albuterol (VENTOLIN HFA) 90 mcg/actuation inhaler Inhale 2 puffs into the lungs every 4 (four) hours as needed for Wheezing. Rescue  Qty: 3 Inhaler, Refills: 4    Comments: Please consider 90 day supplies to promote better adherence  Associated Diagnoses: Inhalation of noxious fumes, accidental or unintentional, subsequent encounter      albuterol-ipratropium 2.5mg-0.5mg/3mL (DUO-NEB) 0.5 mg-3 mg(2.5 mg base)/3 mL nebulizer solution Take 3 mLs by nebulization every 6 (six) hours. Worker's comp.[id number]  Qty: 1080 mL, Refills: 3    Associated Diagnoses: Inhalation of noxious fumes, accidental or unintentional, subsequent encounter      budesonide-formoterol 160-4.5 mcg (SYMBICORT) 160-4.5 mcg/actuation HFAA Inhale 2 puffs into the lungs every 12 (twelve) hours. Wash out mouth after using. Worker's Comp  Qty: 3 Inhaler, Refills: 4    Associated Diagnoses: Mild intermittent asthma with acute exacerbation      cromolyn (INTAL) 20 mg/2 mL Nebu Take 2 mLs (20 mg total) by nebulization 3 (three) times daily. Worker's Comp  Qty: 100 mL, Refills: 11    Associated Diagnoses: Mild intermittent asthma with acute exacerbation      montelukast (SINGULAIR) 10 mg tablet Take 1 tablet (10 mg total) by mouth every evening. Worker's comp.[id number]  Qty: 90 tablet, Refills: 3    Associated Diagnoses: Inhalation of noxious fumes, accidental or unintentional, subsequent encounter      predniSONE (DELTASONE) 20 MG tablet Prednisone 60 mg/ day for 3 days, 40 mg/day for 3 days,20 mg/ day for 3 days, (1/2 tablet )10 mg a day for 3 days.Worker's Comp  Qty: 20 tablet, Refills: 1    Associated Diagnoses: Mild intermittent asthma with acute exacerbation      promethazine-dextromethorphan (PROMETHAZINE-DM) 6.25-15 mg/5 mL Syrp Take 5 mLs by mouth every 6 (six) hours as needed. Worker's comp  Qty: 473 mL, Refills: 5    Associated Diagnoses: Inhalation of noxious fumes, accidental or unintentional, initial encounter      sertraline  (ZOLOFT) 50 MG tablet Take 1 tablet (50 mg total) by mouth once daily. Worker's Comp  Qty: 30 tablet, Refills: 11    Associated Diagnoses: Anxiety with depression      famotidine (PEPCID) 40 MG tablet Take 1 tablet (40 mg total) by mouth once daily. Worker's Comp  Qty: 30 tablet, Refills: 11    Associated Diagnoses: Gastroesophageal reflux disease, esophagitis presence not specified      inhalation spacing device (BREATHERITE VALVED MDI CHAMBER) Use as directed for inhalation. Worker's Comp  Qty: 1 Device, Refills: prn    Associated Diagnoses: Mild intermittent asthma with acute exacerbation              Discharge Procedure Orders  Diet general     Activity as tolerated

## 2018-04-12 NOTE — ANESTHESIA RELEASE NOTE
"Anesthesia Release from PACU Note    Patient: Lamin Manjarrez    Procedure(s) Performed: Procedure(s) (LRB):  ESOPHAGOGASTRODUODENOSCOPY (EGD) (N/A)    Anesthesia type: MAC    Post pain: Adequate analgesia    Post assessment: no apparent anesthetic complications, tolerated procedure well and no evidence of recall    Last Vitals:   Visit Vitals  /69 (BP Location: Left arm, Patient Position: Lying)   Pulse 68   Temp 36.4 °C (97.6 °F) (Oral)   Resp 20   Ht 5' 6" (1.676 m)   Wt (!) 153.3 kg (338 lb)   SpO2 100%   BMI 54.55 kg/m²       Post vital signs: stable    Level of consciousness: awake, alert  and oriented    Nausea/Vomiting: no nausea/no vomiting    Complications: none    Airway Patency: patent    Respiratory: unassisted, spontaneous ventilation, room air    Cardiovascular: stable and blood pressure at baseline    Hydration: euvolemic  "
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
## 2018-04-12 NOTE — ANESTHESIA PREPROCEDURE EVALUATION
04/12/2018  Lamin Manjarrez is a 48 y.o., male.    Anesthesia Evaluation    I have reviewed the Patient Summary Reports.    I have reviewed the Nursing Notes.   I have reviewed the Medications.     Review of Systems  Anesthesia Hx:  No previous Anesthesia  Denies Family Hx of Anesthesia complications.    Social:  Non-Smoker    Cardiovascular:  Cardiovascular Normal     Pulmonary:   Asthma    Hepatic/GI:   GERD, poorly controlled    Neurological:  Neurology Normal    Endocrine:  Endocrine Normal        Physical Exam  General:  Obesity    Airway/Jaw/Neck:  Airway Findings: Mouth Opening: Normal General Airway Assessment: Adult  Mallampati: II      Dental:  Dental Findings: upper front caps   Chest/Lungs:  Chest/Lungs Findings: Clear to auscultation     Heart/Vascular:  Heart Findings: Rate: Normal  Rhythm: Regular Rhythm             Anesthesia Plan  Type of Anesthesia, risks & benefits discussed:  Anesthesia Type:  MAC  Patient's Preference:   Intra-op Monitoring Plan:   Intra-op Monitoring Plan Comments:   Post Op Pain Control Plan:   Post Op Pain Control Plan Comments:   Induction:   IV  Beta Blocker:  Patient is not currently on a Beta-Blocker (No further documentation required).       Informed Consent: Patient understands risks and agrees with Anesthesia plan.  Questions answered. Anesthesia consent signed with patient.  ASA Score: 2     Day of Surgery Review of History & Physical: I have interviewed and examined the patient. I have reviewed the patient's H&P dated:  There are no significant changes.          Ready For Surgery From Anesthesia Perspective.

## 2018-04-12 NOTE — TELEPHONE ENCOUNTER
----- Message from Edna Figueroa sent at 4/12/2018 12:54 PM CDT -----  Please contact patient regarding re-scheduling. I was unable to schedule him anytime soon and he wants to be seen as quickly as possible.       Thank you

## 2018-04-16 ENCOUNTER — TELEPHONE (OUTPATIENT)
Dept: PULMONOLOGY | Facility: CLINIC | Age: 48
End: 2018-04-16

## 2018-04-16 NOTE — TELEPHONE ENCOUNTER
----- Message from Karissa Miller sent at 4/16/2018 11:36 AM CDT -----  Contact: pt q  Pt states that he need a script for promethazine-dextromethorphan (PROMETHAZINE-DM) 6.25-15 mg/5 mL Syrp 473 mL.   [phone] (home)       .    [id number] - [place], [place] - [address] [facility]  [address] [place] [id number]  Phone: [phone] Fax: [phone]

## 2018-04-16 NOTE — TELEPHONE ENCOUNTER
Pt contacted to inform him that he will need to make an appointment to be seen by Dr Lugo before another Rx can be written.     Voicemail not set up yet.

## 2018-04-18 ENCOUNTER — TELEPHONE (OUTPATIENT)
Dept: PULMONOLOGY | Facility: CLINIC | Age: 48
End: 2018-04-18

## 2018-04-18 DIAGNOSIS — J45.21 MILD INTERMITTENT ASTHMA WITH ACUTE EXACERBATION: ICD-10-CM

## 2018-04-18 DIAGNOSIS — T59.91XA INHALATION OF NOXIOUS FUMES, ACCIDENTAL OR UNINTENTIONAL, INITIAL ENCOUNTER: ICD-10-CM

## 2018-04-18 DIAGNOSIS — T59.91XD INHALATION OF NOXIOUS FUMES, ACCIDENTAL OR UNINTENTIONAL, SUBSEQUENT ENCOUNTER: ICD-10-CM

## 2018-04-18 NOTE — TELEPHONE ENCOUNTER
Last Visit: 12/06/2018  Next Visit: 5/14/2018  Last filled: 10/20/2017  Pharmacy Verified: [id number] - [place] [address]

## 2018-04-18 NOTE — TELEPHONE ENCOUNTER
----- Message from Kerrylan Keenan sent at 4/18/2018  2:19 PM CDT -----  Contact: pt  States he has bronchitis and he needs a refill on his cough syrup. States the pharmacy sent a request one day last week on one on Monday this week. States he needs a refill for his  CPAP and singular medicine. States he went to the ER and they gave him an antibiotic and a steroid shot. Please call pt at [phone]. Thank you

## 2018-04-18 NOTE — TELEPHONE ENCOUNTER
----- Message from Kerrylan Keenan sent at 4/18/2018  2:19 PM CDT -----  Contact: pt  States he has bronchitis and he needs a refill on his cough syrup. States the pharmacy sent a request one day last week on one on Monday this week. States he needs a refill for his  CPAP and singular medicine. States he went to the ER and they gave him an antibiotic and a steroid shot. Please call pt at [phone]. Thank you

## 2018-04-19 NOTE — TELEPHONE ENCOUNTER
Pt stated that he is requesting PROMETHAZINE-Dm, SINGULAIR, and Z-GARCIA.    Last Visit: 12/06/2017  Next Visit: 5/14/2018  Last filled  PROMETHAZINE-Dm: 1/23/2018  SINGULAIR: 12/06/2017  Z-GARCIA: 12/06/2017  Pharmacy Verified: [id number] - [place] - [address]

## 2018-04-19 NOTE — TELEPHONE ENCOUNTER
----- Message from Payal Bautista sent at 4/19/2018 11:05 AM CDT -----  Contact: pt   Pt is requesting a call back from nurse in regards to his med refill.                       [phone] (home)       1. What is the name of the medication you are requesting? SINGULAIR  2. What is the dose? 10 mg  3. How do you take the medication? Orally, topically, etc? Orally  4. How often do you take this medication?  3 times daily  5. Do you need a 30 day or 90 day supply? 90  6. How many refills are you requesting? Per Dr Garrido. What is your preferred pharmacy and location of the pharmacy?     [id number] - [place] [address]  Phone: [phone] Fax: [phone]    8. Who can we contact with further questions? Pt        1. What is the name of the medication you are requesting? PROMETHAZINE-DM   2. What is the dose?  6.25-15 mg  3. How do you take the medication? Orally, topically, etc? Orally  4. How often do you take this medication? Every 6 hours  5. Do you need a 30 day or 90 day supply? 30  6. How many refills are you requesting? Per Dr Garrido. What is your preferred pharmacy and location of the pharmacy?     [id number]  [place] [id number]  Phone: [phone] Fax: [phone]    8. Who can we contact with further questions? Pt        1. What is the name of the medication you are requesting? C-Pack  2. What is the dose?   3. How do you take the medication? Orally, topically, etc? Orally  4. How often do you take this medication? As directed  5. Do you need a 30 day or 90 day supply?   6. How many refills are you requesting? Per Dr Garrido. What is your preferred pharmacy and location of the pharmacy?     [id number] - [place] [id number]  Phone: [phone] Fax: [phone]    8. Who can we contact with further questions? pt

## 2018-04-20 ENCOUNTER — TELEPHONE (OUTPATIENT)
Dept: PULMONOLOGY | Facility: CLINIC | Age: 48
End: 2018-04-20

## 2018-04-20 RX ORDER — AZITHROMYCIN 250 MG/1
250 TABLET, FILM COATED ORAL DAILY
Qty: 6 TABLET | Refills: 1 | Status: SHIPPED | OUTPATIENT
Start: 2018-04-20

## 2018-04-20 RX ORDER — MONTELUKAST SODIUM 10 MG/1
10 TABLET ORAL NIGHTLY
Qty: 90 TABLET | Refills: 3 | Status: SHIPPED | OUTPATIENT
Start: 2018-04-20

## 2018-04-20 RX ORDER — PROMETHAZINE HYDROCHLORIDE AND DEXTROMETHORPHAN HYDROBROMIDE 6.25; 15 MG/5ML; MG/5ML
5 SYRUP ORAL EVERY 6 HOURS PRN
Qty: 473 ML | Refills: 5 | Status: SHIPPED | OUTPATIENT
Start: 2018-04-20 | End: 2018-04-21 | Stop reason: SDUPTHER

## 2018-04-20 NOTE — TELEPHONE ENCOUNTER
Staff received request for pt to be offered an earlier appointment.     Pt accepted [facility] appointment on 4/23/2018 at 1120 with Dr Lugo. Pt informed of all pre-appointment testing. Pt verbalized understanding.

## 2018-04-21 DIAGNOSIS — T59.91XA INHALATION OF NOXIOUS FUMES, ACCIDENTAL OR UNINTENTIONAL, INITIAL ENCOUNTER: ICD-10-CM

## 2018-04-24 ENCOUNTER — TELEPHONE (OUTPATIENT)
Dept: PULMONOLOGY | Facility: CLINIC | Age: 48
End: 2018-04-24

## 2018-04-24 DIAGNOSIS — R05.9 COUGH: Primary | ICD-10-CM

## 2018-04-24 DIAGNOSIS — T59.91XA INHALATION OF NOXIOUS FUMES, ACCIDENTAL OR UNINTENTIONAL, INITIAL ENCOUNTER: ICD-10-CM

## 2018-04-24 RX ORDER — PROMETHAZINE HYDROCHLORIDE AND DEXTROMETHORPHAN HYDROBROMIDE 6.25; 15 MG/5ML; MG/5ML
5 SYRUP ORAL EVERY 6 HOURS PRN
Qty: 240 ML | Refills: 0 | Status: SHIPPED | OUTPATIENT
Start: 2018-04-24 | End: 2018-11-12 | Stop reason: SDUPTHER

## 2018-04-24 RX ORDER — PROMETHAZINE HYDROCHLORIDE AND DEXTROMETHORPHAN HYDROBROMIDE 6.25; 15 MG/5ML; MG/5ML
SYRUP ORAL
Qty: 240 ML | Refills: 4 | Status: SHIPPED | OUTPATIENT
Start: 2018-04-24 | End: 2018-04-24 | Stop reason: SDUPTHER

## 2018-04-24 RX ORDER — ALBUTEROL SULFATE 90 UG/1
AEROSOL, METERED RESPIRATORY (INHALATION)
Qty: 18 EACH | Refills: 11 | Status: SHIPPED | OUTPATIENT
Start: 2018-04-24

## 2018-04-24 NOTE — TELEPHONE ENCOUNTER
----- Message from Payal Bautista sent at 4/24/2018  9:18 AM CDT -----  Contact: [facility]  Caller is requesting a call back from nurse in regards to pt refill.   [phone]         1. What is the name of the medication you are requesting?  PROMETHAZINE-DM  2. What is the dose? 6.25-15 mg  Per 5 ml  3. How do you take the medication? Orally, topically, etc? Orally  4. How often do you take this medication? Every 6 hr as needed   5. Do you need a 30 day or 90 day supply? 23  6. How many refills are you requesting? Per   7. What is your preferred pharmacy and location of the pharmacy?     [id number] - [place] - [address] [facility]  [address] [facility] [address]  Phone: [phone] Fax: [phone]    8. Who can we contact with further questions? pt

## 2018-07-01 RX ORDER — TRAZODONE HYDROCHLORIDE 100 MG/1
TABLET ORAL
Qty: 30 TABLET | Refills: 9 | Status: SHIPPED | OUTPATIENT
Start: 2018-07-01

## 2018-11-09 DIAGNOSIS — T59.91XD INHALATION OF NOXIOUS FUMES, ACCIDENTAL OR UNINTENTIONAL, SUBSEQUENT ENCOUNTER: ICD-10-CM

## 2018-11-09 RX ORDER — IPRATROPIUM BROMIDE AND ALBUTEROL SULFATE 2.5; .5 MG/3ML; MG/3ML
SOLUTION RESPIRATORY (INHALATION)
Qty: 120 VIAL | Refills: 11 | Status: SHIPPED | OUTPATIENT
Start: 2018-11-09

## 2018-11-12 DIAGNOSIS — R05.9 COUGH: ICD-10-CM

## 2018-11-12 DIAGNOSIS — T59.91XA INHALATION OF NOXIOUS FUMES, ACCIDENTAL OR UNINTENTIONAL, INITIAL ENCOUNTER: ICD-10-CM

## 2018-11-12 RX ORDER — PROMETHAZINE HYDROCHLORIDE AND DEXTROMETHORPHAN HYDROBROMIDE 6.25; 15 MG/5ML; MG/5ML
5 SYRUP ORAL EVERY 6 HOURS PRN
Qty: 240 ML | Refills: 0 | Status: SHIPPED | OUTPATIENT
Start: 2018-11-12

## 2019-09-07 ENCOUNTER — HISTORICAL (OUTPATIENT)
Dept: ADMINISTRATIVE | Facility: HOSPITAL | Age: 49
End: 2019-09-07

## 2022-04-09 ENCOUNTER — HISTORICAL (OUTPATIENT)
Dept: ADMINISTRATIVE | Facility: HOSPITAL | Age: 52
End: 2022-04-09
Payer: OTHER MISCELLANEOUS

## 2022-04-26 VITALS — HEIGHT: 67 IN | WEIGHT: 315 LBS | BODY MASS INDEX: 49.44 KG/M2
